# Patient Record
Sex: FEMALE | Race: OTHER | HISPANIC OR LATINO | Employment: OTHER | URBAN - METROPOLITAN AREA
[De-identification: names, ages, dates, MRNs, and addresses within clinical notes are randomized per-mention and may not be internally consistent; named-entity substitution may affect disease eponyms.]

---

## 2017-01-11 ENCOUNTER — HOSPITAL ENCOUNTER (OUTPATIENT)
Dept: LAB | Facility: MEDICAL CENTER | Age: 57
End: 2017-01-11
Attending: PHYSICIAN ASSISTANT
Payer: OTHER GOVERNMENT

## 2017-01-11 LAB
25(OH)D3 SERPL-MCNC: 15 NG/ML (ref 30–100)
ALBUMIN SERPL BCP-MCNC: 4.4 G/DL (ref 3.2–4.9)
ALBUMIN/GLOB SERPL: 1.3 G/DL
ALP SERPL-CCNC: 80 U/L (ref 30–99)
ALT SERPL-CCNC: 31 U/L (ref 2–50)
ANION GAP SERPL CALC-SCNC: 6 MMOL/L (ref 0–11.9)
APPEARANCE UR: CLEAR
AST SERPL-CCNC: 25 U/L (ref 12–45)
BASOPHILS # BLD AUTO: 0.03 K/UL (ref 0–0.12)
BASOPHILS NFR BLD AUTO: 0.5 % (ref 0–1.8)
BILIRUB SERPL-MCNC: 0.4 MG/DL (ref 0.1–1.5)
BILIRUB UR QL STRIP.AUTO: NEGATIVE
BUN SERPL-MCNC: 16 MG/DL (ref 8–22)
CALCIUM SERPL-MCNC: 9.4 MG/DL (ref 8.5–10.5)
CHLORIDE SERPL-SCNC: 109 MMOL/L (ref 96–112)
CHOLEST SERPL-MCNC: 167 MG/DL (ref 100–199)
CO2 SERPL-SCNC: 25 MMOL/L (ref 20–33)
COLOR UR AUTO: YELLOW
CREAT SERPL-MCNC: 0.84 MG/DL (ref 0.5–1.4)
CREAT UR-MCNC: 160.1 MG/DL
EOSINOPHIL # BLD: 0.11 K/UL (ref 0–0.51)
EOSINOPHIL NFR BLD AUTO: 2 % (ref 0–6.9)
EPITHELIAL CELLS 1715: ABNORMAL /HPF
ERYTHROCYTE [DISTWIDTH] IN BLOOD BY AUTOMATED COUNT: 40.9 FL (ref 35.9–50)
EST. AVERAGE GLUCOSE BLD GHB EST-MCNC: 105 MG/DL
GLOBULIN SER CALC-MCNC: 3.4 G/DL (ref 1.9–3.5)
GLUCOSE SERPL-MCNC: 103 MG/DL (ref 65–99)
GLUCOSE UR STRIP.AUTO-MCNC: NEGATIVE MG/DL
HBA1C MFR BLD: 5.3 % (ref 0–5.6)
HCT VFR BLD AUTO: 44 % (ref 37–47)
HDLC SERPL-MCNC: 36 MG/DL
HGB BLD-MCNC: 14.7 G/DL (ref 12–16)
IMM GRANULOCYTES # BLD AUTO: 0.02 K/UL (ref 0–0.11)
IMM GRANULOCYTES NFR BLD AUTO: 0.4 % (ref 0–0.9)
KETONES UR STRIP.AUTO-MCNC: NEGATIVE MG/DL
LDLC SERPL CALC-MCNC: 113 MG/DL
LEUKOCYTE ESTERASE UR QL STRIP.AUTO: ABNORMAL
LYMPHOCYTES # BLD: 1.97 K/UL (ref 1–4.8)
LYMPHOCYTES NFR BLD AUTO: 35.7 % (ref 22–41)
MCH RBC QN AUTO: 30.4 PG (ref 27–33)
MCHC RBC AUTO-ENTMCNC: 33.4 G/DL (ref 33.6–35)
MCV RBC AUTO: 91.1 FL (ref 81.4–97.8)
MICRO URNS: ABNORMAL
MICROALBUMIN UR-MCNC: 0.8 MG/DL
MICROALBUMIN/CREAT UR: 5 MG/G (ref 0–30)
MONOCYTES # BLD: 0.39 K/UL (ref 0–0.85)
MONOCYTES NFR BLD AUTO: 7.1 % (ref 0–13.4)
NEUTROPHILS # BLD: 3 K/UL (ref 2–7.15)
NEUTROPHILS NFR BLD AUTO: 54.3 % (ref 44–72)
NITRITE UR QL STRIP.AUTO: NEGATIVE
NRBC # BLD AUTO: 0 K/UL
NRBC BLD-RTO: 0 /100 WBC
PH UR: 5.5 [PH]
PLATELET # BLD AUTO: 270 K/UL (ref 164–446)
PMV BLD AUTO: 10.4 FL (ref 9–12.9)
POTASSIUM SERPL-SCNC: 4.2 MMOL/L (ref 3.6–5.5)
PROT SERPL-MCNC: 7.8 G/DL (ref 6–8.2)
PROT UR QL STRIP: NEGATIVE MG/DL
RBC # BLD AUTO: 4.83 M/UL (ref 4.2–5.4)
RBC #/AREA URNS HPF: ABNORMAL /HPF
RBC UR QL AUTO: ABNORMAL
SODIUM SERPL-SCNC: 140 MMOL/L (ref 135–145)
SP GR UR STRIP.AUTO: 1.02
T4 SERPL-MCNC: 7.4 UG/DL (ref 4–12)
TRANS CELLS URNS QL MICRO: ABNORMAL /HPF
TRIGL SERPL-MCNC: 90 MG/DL (ref 0–149)
TSH SERPL DL<=0.005 MIU/L-ACNC: 3.75 UIU/ML (ref 0.3–3.7)
WBC # BLD AUTO: 5.5 K/UL (ref 4.8–10.8)
WBC #/AREA URNS HPF: ABNORMAL /HPF

## 2017-01-11 PROCEDURE — 80053 COMPREHEN METABOLIC PANEL: CPT

## 2017-01-11 PROCEDURE — 83036 HEMOGLOBIN GLYCOSYLATED A1C: CPT

## 2017-01-11 PROCEDURE — 82043 UR ALBUMIN QUANTITATIVE: CPT

## 2017-01-11 PROCEDURE — 85025 COMPLETE CBC W/AUTO DIFF WBC: CPT

## 2017-01-11 PROCEDURE — 80061 LIPID PANEL: CPT

## 2017-01-11 PROCEDURE — 36415 COLL VENOUS BLD VENIPUNCTURE: CPT

## 2017-01-11 PROCEDURE — 84436 ASSAY OF TOTAL THYROXINE: CPT

## 2017-01-11 PROCEDURE — 82306 VITAMIN D 25 HYDROXY: CPT

## 2017-01-11 PROCEDURE — 82570 ASSAY OF URINE CREATININE: CPT

## 2017-01-11 PROCEDURE — 81001 URINALYSIS AUTO W/SCOPE: CPT

## 2017-01-11 PROCEDURE — 84443 ASSAY THYROID STIM HORMONE: CPT

## 2017-12-04 ENCOUNTER — HOSPITAL ENCOUNTER (OUTPATIENT)
Dept: RADIOLOGY | Facility: MEDICAL CENTER | Age: 57
End: 2017-12-04
Attending: NURSE PRACTITIONER
Payer: OTHER GOVERNMENT

## 2017-12-04 DIAGNOSIS — M79.671 RIGHT FOOT PAIN: ICD-10-CM

## 2017-12-04 PROCEDURE — 73630 X-RAY EXAM OF FOOT: CPT | Mod: RT

## 2018-12-10 ENCOUNTER — TELEPHONE (OUTPATIENT)
Dept: MEDICAL GROUP | Facility: PHYSICIAN GROUP | Age: 58
End: 2018-12-10

## 2018-12-10 NOTE — TELEPHONE ENCOUNTER
Future Appointments       Provider Department Center    12/11/2018 8:25 AM Maryan Little P.A.-C. [facility]        NEW PATIENT VISIT PRE-VISIT PLANNING    1.  EpicCare Patient is checked in Patient Demographics? YES    2.  Immunizations were updated in Epic using WebIZ?: No WebIZ record       •  Web Iz Recommendations: FLU, TDAP and ZOSTAVAX (Shingles)    3.  Is this appointment scheduled as a Hospital Follow-Up? No    4.  Patient is due for the following Health Maintenance Topics:   Health Maintenance Due   Topic Date Due   • IMM DTaP/Tdap/Td Vaccine (1 - Tdap) 08/27/1981   • PAP SMEAR  08/27/1983   • MAMMOGRAM  08/27/2002   • COLONOSCOPY  08/27/2012   • IMM ZOSTER VACCINES (1 of 2) 08/27/2012   • IMM INFLUENZA (1) 09/01/2018       5.  Reviewed/Updated the following with patient:   •   Preferred Pharmacy? YES       •   Preferred Lab? YES       •   Preferred Communication? YES       •   Allergies? YES       •   Medications? YES. Was Abstract Encounter opened and chart updated? YES       •   Social History? YES. Was Abstract Encounter opened and chart updated? YES       •   Family History (document living status of immediate family members and if + hx of cancer, diabetes, hypertension, hyperlipidemia, heart attack, stroke) YES. Was Abstract Encounter opened and chart updated? YES    6.  Updated Care Team?       •   DME Company (gait device, O2, CPAP, etc.) YES       •   Other Specialists (eye doctor, derm, GYN, cardiology, endo, etc): YES    7.  MDX printed for Provider? NO    8.  Patient was informed to arrive 15 min prior to their   scheduled appointment and bring in their medication bottles.

## 2018-12-11 ENCOUNTER — OFFICE VISIT (OUTPATIENT)
Dept: MEDICAL GROUP | Facility: PHYSICIAN GROUP | Age: 58
End: 2018-12-11
Payer: OTHER GOVERNMENT

## 2018-12-11 VITALS
OXYGEN SATURATION: 95 % | HEART RATE: 76 BPM | HEIGHT: 65 IN | BODY MASS INDEX: 30.16 KG/M2 | SYSTOLIC BLOOD PRESSURE: 106 MMHG | TEMPERATURE: 98.6 F | DIASTOLIC BLOOD PRESSURE: 70 MMHG | WEIGHT: 181 LBS

## 2018-12-11 DIAGNOSIS — Z00.00 ANNUAL PHYSICAL EXAM: ICD-10-CM

## 2018-12-11 DIAGNOSIS — E66.9 OBESITY (BMI 30-39.9): ICD-10-CM

## 2018-12-11 DIAGNOSIS — Z12.39 ENCOUNTER FOR SCREENING FOR MALIGNANT NEOPLASM OF BREAST: ICD-10-CM

## 2018-12-11 DIAGNOSIS — Z12.12 SCREENING FOR COLORECTAL CANCER: ICD-10-CM

## 2018-12-11 DIAGNOSIS — Z00.00 BLOOD TESTS FOR ROUTINE GENERAL PHYSICAL EXAMINATION: ICD-10-CM

## 2018-12-11 DIAGNOSIS — Z12.11 SCREENING FOR COLORECTAL CANCER: ICD-10-CM

## 2018-12-11 PROCEDURE — 99396 PREV VISIT EST AGE 40-64: CPT | Performed by: PHYSICIAN ASSISTANT

## 2018-12-11 ASSESSMENT — PATIENT HEALTH QUESTIONNAIRE - PHQ9: CLINICAL INTERPRETATION OF PHQ2 SCORE: 0

## 2018-12-11 NOTE — PROGRESS NOTES
"Dominga Hatfield is a 58 y.o. female here for annual. Is a new patient to me and [facility] and is also establishing care today.    HPI:      Patient presents today for annual exam.  She is new to me, previous patient of Mariah Gomez PA-C at [facility]. She endorses no current medical problems. No history of any serious medical problems. She is obese with current BMI just over 30. Patient acknowledges today that she needs to lose weight. She admits that she has let herself go since previous clavicle fracture and rotator cuff tear that she suffered in 2014 and 2016. Prior to that, went through Weight Watchers program and did really well with it, losing significant weight. She is motivated to lose weight. She denies any other complaints or concerns today.      Current medicines (including changes today)  Current Outpatient Prescriptions   Medication Sig Dispense Refill   • Multiple Vitamins-Minerals (MULTIVITAL PO) Take  by mouth.     • Omega-3 Fatty Acids (FISH OIL PO) Take  by mouth.     • CALCIUM PO Take  by mouth.       No current facility-administered medications for this visit.      She  has a past medical history of Clavicle fracture and Rotator cuff tear arthropathy, right.  She  has a past surgical history that includes arthroplasty, shoulder (Right, 2016) and clavicle orif (2014).  Social History   Substance Use Topics   • Smoking status: Never Smoker   • Smokeless tobacco: Never Used   • Alcohol use No     Social History     Social History Narrative   • No narrative on file     Family History   Problem Relation Age of Onset   • Family history unknown: Yes     No family status information on file.         ROS  No chest pain, No SOB  All other systems reviewed and are negative       Objective:     Blood pressure 106/70, pulse 76, temperature 37 °C (98.6 °F), height 1.638 m (5' 4.5\"), weight 82.1 kg (181 lb), SpO2 95 %. Body mass index is 30.59 kg/m².  Physical Exam:    Constitutional: Alert, obese but otherwise " well-appearing, no distress.  Skin: Warm, dry, good turgor, no rashes in visible areas.  Eye: Equal, round and reactive, conjunctiva clear, lids normal.  ENMT: Lips without lesions, good dentition, oropharynx clear.  Neck: Trachea midline, no masses, no thyromegaly. No submandibular or cervical  lymphadenopathy.  Respiratory: Unlabored respiratory effort, lungs clear to auscultation, no wheezes, no ronchi.  Cardiovascular: Normal S1, S2, no murmur, no edema.  Abdomen: Soft, non-tender, no masses, no hepatosplenomegaly.  Psych: Alert and oriented x3, normal affect and mood.      Assessment and Plan:   The following treatment plan was discussed    1. Annual physical exam  Exam today is normal other than obesity. Counseled patient on diet and exercise.  HCM: Due for colonoscopy which she declines. Is willing to do FIT which I have ordered. Due for mammogram which I have ordered. Due for influenza vaccine which she declines. Due for pap smear - will schedule well-woman exam    2. Obesity (BMI 30-39.9)  Chronic issue for patient, uncontrolled. Patient expresses motivation to improve lifestyle to lose weight. I did also discuss the Medical Weight Management Program which she is interested in. Referral ordered.  - Patient identified as having weight management issue.  Appropriate orders and counseling given.    3. Encounter for screening for malignant neoplasm of breast  - MA-SCREEN MAMMO W/CAD-BILAT; Future    4. Screening for colorectal cancer  - OCCULT BLOOD FECES IMMUNOASSAY (FIT); Future    5. Blood tests for routine general physical examination  Patient advised to have screening lab work done at earliest convenience.  - COMP METABOLIC PANEL; Future  - Lipid Profile; Future  - VITAMIN D,25 HYDROXY; Future      Followup: Return for well-woman; Short.    Maryan Little P.A.-C.

## 2018-12-14 ENCOUNTER — APPOINTMENT (OUTPATIENT)
Dept: RADIOLOGY | Facility: MEDICAL CENTER | Age: 58
End: 2018-12-14
Attending: PHYSICIAN ASSISTANT
Payer: OTHER GOVERNMENT

## 2018-12-17 ENCOUNTER — HOSPITAL ENCOUNTER (OUTPATIENT)
Dept: RADIOLOGY | Facility: MEDICAL CENTER | Age: 58
End: 2018-12-17
Attending: PHYSICIAN ASSISTANT
Payer: OTHER GOVERNMENT

## 2018-12-17 DIAGNOSIS — Z12.39 ENCOUNTER FOR SCREENING FOR MALIGNANT NEOPLASM OF BREAST: ICD-10-CM

## 2018-12-17 PROCEDURE — 77067 SCR MAMMO BI INCL CAD: CPT

## 2018-12-18 ENCOUNTER — TELEPHONE (OUTPATIENT)
Dept: MEDICAL GROUP | Facility: PHYSICIAN GROUP | Age: 58
End: 2018-12-18

## 2018-12-18 NOTE — TELEPHONE ENCOUNTER
Future Appointments       Provider Department Center    12/19/2018 8:45 AM Maryan Little P.A.-C. [facility]      ESTABLISHED PATIENT PRE-VISIT PLANNING     Patient was contacted to complete PVP.     Note: Patient will not be contacted if there is no indication to call.     1.  Reviewed notes from the last few office visits within the medical group: Yes    2.  If any orders were placed at last visit or intended to be done for this visit (i.e. 6 mos follow-up), do we have Results/Consult Notes?        •  Labs - Labs ordered, NOT completed. Patient advised to complete prior to next appointment.       •  Imaging - Imaging ordered, completed and results are in chart.       •  Referrals - Referral ordered, patient has NOT been seen.    3. Is this appointment scheduled as a Hospital Follow-Up? No    4.  Immunizations were updated in Epic using WebIZ?: No WebIZ record       •  Web Iz Recommendations: FLU, TDAP and SHINGRIX (Shingles)    5.  Patient is due for the following Health Maintenance Topics:   Health Maintenance Due   Topic Date Due   • IMM DTaP/Tdap/Td Vaccine (1 - Tdap) 08/27/1981   • PAP SMEAR  08/27/1983   • MAMMOGRAM  08/27/2002   • COLON CANCER SCREENING ANNUAL FIT  08/27/2012   • IMM ZOSTER VACCINES (1 of 2) 08/27/2012   • IMM INFLUENZA (1) 09/01/2018       - Patient already has appointment scheduled for Pap.    6.  MDX printed for Provider? NO    7.  Patient was informed to arrive 15 min prior to their scheduled appointment and bring in their medication bottles. SILKE

## 2018-12-19 ENCOUNTER — HOSPITAL ENCOUNTER (OUTPATIENT)
Facility: MEDICAL CENTER | Age: 58
End: 2018-12-19
Attending: PHYSICIAN ASSISTANT
Payer: OTHER GOVERNMENT

## 2018-12-19 ENCOUNTER — OFFICE VISIT (OUTPATIENT)
Dept: MEDICAL GROUP | Facility: PHYSICIAN GROUP | Age: 58
End: 2018-12-19
Payer: OTHER GOVERNMENT

## 2018-12-19 ENCOUNTER — HOSPITAL ENCOUNTER (OUTPATIENT)
Dept: LAB | Facility: MEDICAL CENTER | Age: 58
End: 2018-12-19
Attending: PHYSICIAN ASSISTANT
Payer: OTHER GOVERNMENT

## 2018-12-19 VITALS
SYSTOLIC BLOOD PRESSURE: 102 MMHG | BODY MASS INDEX: 30.16 KG/M2 | HEART RATE: 72 BPM | DIASTOLIC BLOOD PRESSURE: 72 MMHG | OXYGEN SATURATION: 95 % | HEIGHT: 65 IN | TEMPERATURE: 97.3 F | WEIGHT: 181 LBS

## 2018-12-19 DIAGNOSIS — E55.9 VITAMIN D DEFICIENCY: ICD-10-CM

## 2018-12-19 DIAGNOSIS — Z01.419 ENCOUNTER FOR ROUTINE GYNECOLOGICAL EXAMINATION WITH PAPANICOLAOU SMEAR OF CERVIX: ICD-10-CM

## 2018-12-19 DIAGNOSIS — Z00.00 BLOOD TESTS FOR ROUTINE GENERAL PHYSICAL EXAMINATION: ICD-10-CM

## 2018-12-19 LAB
25(OH)D3 SERPL-MCNC: 18 NG/ML (ref 30–100)
ALBUMIN SERPL BCP-MCNC: 4.1 G/DL (ref 3.2–4.9)
ALBUMIN/GLOB SERPL: 1.3 G/DL
ALP SERPL-CCNC: 75 U/L (ref 30–99)
ALT SERPL-CCNC: 32 U/L (ref 2–50)
ANION GAP SERPL CALC-SCNC: 7 MMOL/L (ref 0–11.9)
AST SERPL-CCNC: 24 U/L (ref 12–45)
BILIRUB SERPL-MCNC: 0.5 MG/DL (ref 0.1–1.5)
BUN SERPL-MCNC: 16 MG/DL (ref 8–22)
CALCIUM SERPL-MCNC: 9.5 MG/DL (ref 8.5–10.5)
CHLORIDE SERPL-SCNC: 109 MMOL/L (ref 96–112)
CHOLEST SERPL-MCNC: 154 MG/DL (ref 100–199)
CO2 SERPL-SCNC: 27 MMOL/L (ref 20–33)
CREAT SERPL-MCNC: 0.9 MG/DL (ref 0.5–1.4)
GLOBULIN SER CALC-MCNC: 3.2 G/DL (ref 1.9–3.5)
GLUCOSE SERPL-MCNC: 98 MG/DL (ref 65–99)
HDLC SERPL-MCNC: 38 MG/DL
LDLC SERPL CALC-MCNC: 100 MG/DL
POTASSIUM SERPL-SCNC: 4.1 MMOL/L (ref 3.6–5.5)
PROT SERPL-MCNC: 7.3 G/DL (ref 6–8.2)
SODIUM SERPL-SCNC: 143 MMOL/L (ref 135–145)
TRIGL SERPL-MCNC: 79 MG/DL (ref 0–149)

## 2018-12-19 PROCEDURE — 36415 COLL VENOUS BLD VENIPUNCTURE: CPT

## 2018-12-19 PROCEDURE — 87624 HPV HI-RISK TYP POOLED RSLT: CPT

## 2018-12-19 PROCEDURE — 99396 PREV VISIT EST AGE 40-64: CPT | Performed by: PHYSICIAN ASSISTANT

## 2018-12-19 PROCEDURE — 80061 LIPID PANEL: CPT

## 2018-12-19 PROCEDURE — 82306 VITAMIN D 25 HYDROXY: CPT

## 2018-12-19 PROCEDURE — 88175 CYTOPATH C/V AUTO FLUID REDO: CPT

## 2018-12-19 PROCEDURE — 82274 ASSAY TEST FOR BLOOD FECAL: CPT

## 2018-12-19 PROCEDURE — 80053 COMPREHEN METABOLIC PANEL: CPT

## 2018-12-19 RX ORDER — ERGOCALCIFEROL 1.25 MG/1
50000 CAPSULE ORAL
Qty: 12 CAP | Refills: 0 | Status: SHIPPED | OUTPATIENT
Start: 2018-12-19 | End: 2020-09-16

## 2018-12-19 NOTE — PROGRESS NOTES
"Chief Complaint: Well-woman    Dominga Hatfield is a 58 y.o. female who presents for annual gynecologic exam with pap smear. Is an established patient of mine.      Pt has GYN provider: no  Last pap smear: 2 years ago and normal per patient. 1 previous abnormal \"years ago.\" States had repeated and was then normal.  Last mammogram: 12/17/18 - results not back yet  Last Td: 2016    Gynecologic concerns today: none    Patient specifically denies any vaginal discharge, itching, burning, foul odor, genital lesions, pelvic pain. She is not currently sexually active. No current concern for STDs.    She also denies any breast concerns. She specifically denies any breast skin changes, palpable lumps, nipple discharge, breast tenderness, or swollen axillary lymph nodes. Does self-breast exams about once a month.    No family history of gynecologic cancers.    She  has a past medical history of Clavicle fracture and Rotator cuff tear arthropathy, right.  She  has a past surgical history that includes arthroplasty, shoulder (Right, 2016) and clavicle orif (2014).  Current Outpatient Prescriptions   Medication Sig Dispense Refill   • Multiple Vitamins-Minerals (MULTIVITAL PO) Take  by mouth.     • Omega-3 Fatty Acids (FISH OIL PO) Take  by mouth.     • CALCIUM PO Take  by mouth.       No current facility-administered medications for this visit.      Social History   Substance Use Topics   • Smoking status: Never Smoker   • Smokeless tobacco: Never Used   • Alcohol use No       Review Of Systems  As above.      PHYSICAL EXAMINATION:  Blood pressure 102/72, pulse 72, temperature 36.3 °C (97.3 °F), height 1.638 m (5' 4.5\"), weight 82.1 kg (181 lb), SpO2 95 %.  Body mass index is 30.59 kg/m².  Wt Readings from Last 4 Encounters:   12/19/18 82.1 kg (181 lb)   12/11/18 82.1 kg (181 lb)       Constitutional: Alert, well-appearing, no distress.  Skin: Warm, dry, good turgor, no rashes or suspicious lesions in visible areas.  Eye: " Conjunctivae clear, lids normal.  ENMT: Lips without lesions, moist mucus membranes.  Breast: Breasts are normal-appearing and symmetric bilaterally. No skin abnormalities visualized. There are no masses palpated, no tenderness. No visible nipple discharge. No axillary or supraclavicular lymphadenopathy palpated.  Pelvic: Normal-appearing external genitalia. No genital lesions noted. On speculum insertion, vaginal walls have normal rugated appearance. No abnormal-appearing discharge is noted. Cervix easily visualized and appears pink, non-friable and without visible lesions. On bimanual exam, there is no cervical motion tenderness. Uterus is mobile, non-tender and without palpable masses. No adnexal masses or tenderness.    A chaperone was offered to the patient during today's exam. Patient declined chaperone.     ASSESSMENT/PLAN:    1. Encounter for routine gynecological examination with Papanicolaou smear of cervix  Normal exam today. Patient will be notified of pap smear/HPV and mammogram results when back.  HCM:  Pap done today. Declines influenza. Otherwise UTD.   Immunizations per orders  - THINPREP PAP WITH HPV; Future      Return in about 1 year (around 12/19/2019) for Annual; Short.      Maryan Little P.A.-C.

## 2018-12-20 LAB
CYTOLOGY REG CYTOL: NORMAL
HPV HR 12 DNA CVX QL NAA+PROBE: NEGATIVE
HPV16 DNA SPEC QL NAA+PROBE: NEGATIVE
HPV18 DNA SPEC QL NAA+PROBE: NEGATIVE
SPECIMEN SOURCE: NORMAL

## 2018-12-21 DIAGNOSIS — Z12.11 SCREENING FOR COLORECTAL CANCER: ICD-10-CM

## 2018-12-21 DIAGNOSIS — Z12.12 SCREENING FOR COLORECTAL CANCER: ICD-10-CM

## 2018-12-24 ENCOUNTER — TELEPHONE (OUTPATIENT)
Dept: MEDICAL GROUP | Facility: PHYSICIAN GROUP | Age: 58
End: 2018-12-24

## 2018-12-24 LAB — HEMOCCULT STL QL IA: NEGATIVE

## 2018-12-24 NOTE — TELEPHONE ENCOUNTER
----- Message from Maryan Little P.A.-C. sent at 12/24/2018 12:54 AM PST -----  Please inform patient of normal pap smear/HPV results.  Maryan Little P.A.-C.

## 2019-01-04 ENCOUNTER — APPOINTMENT (OUTPATIENT)
Dept: RADIOLOGY | Facility: MEDICAL CENTER | Age: 59
End: 2019-01-04
Attending: EMERGENCY MEDICINE
Payer: OTHER GOVERNMENT

## 2019-01-04 ENCOUNTER — HOSPITAL ENCOUNTER (OUTPATIENT)
Facility: MEDICAL CENTER | Age: 59
End: 2019-01-05
Attending: EMERGENCY MEDICINE | Admitting: HOSPITALIST
Payer: OTHER GOVERNMENT

## 2019-01-04 DIAGNOSIS — R07.9 CHEST PAIN, UNSPECIFIED TYPE: ICD-10-CM

## 2019-01-04 DIAGNOSIS — R06.00 DYSPNEA, UNSPECIFIED TYPE: ICD-10-CM

## 2019-01-04 LAB
ALBUMIN SERPL BCP-MCNC: 4.4 G/DL (ref 3.2–4.9)
ALBUMIN/GLOB SERPL: 1.3 G/DL
ALP SERPL-CCNC: 84 U/L (ref 30–99)
ALT SERPL-CCNC: 49 U/L (ref 2–50)
ANION GAP SERPL CALC-SCNC: 8 MMOL/L (ref 0–11.9)
APTT PPP: 26.8 SEC (ref 24.7–36)
AST SERPL-CCNC: 27 U/L (ref 12–45)
BASOPHILS # BLD AUTO: 0.6 % (ref 0–1.8)
BASOPHILS # BLD: 0.04 K/UL (ref 0–0.12)
BILIRUB SERPL-MCNC: 0.4 MG/DL (ref 0.1–1.5)
BNP SERPL-MCNC: 8 PG/ML (ref 0–100)
BUN SERPL-MCNC: 19 MG/DL (ref 8–22)
CALCIUM SERPL-MCNC: 10.4 MG/DL (ref 8.5–10.5)
CHLORIDE SERPL-SCNC: 106 MMOL/L (ref 96–112)
CO2 SERPL-SCNC: 27 MMOL/L (ref 20–33)
CREAT SERPL-MCNC: 1.08 MG/DL (ref 0.5–1.4)
D DIMER PPP IA.FEU-MCNC: 0.56 UG/ML (FEU) (ref 0–0.5)
EKG IMPRESSION: NORMAL
EOSINOPHIL # BLD AUTO: 0.08 K/UL (ref 0–0.51)
EOSINOPHIL NFR BLD: 1.1 % (ref 0–6.9)
ERYTHROCYTE [DISTWIDTH] IN BLOOD BY AUTOMATED COUNT: 40.8 FL (ref 35.9–50)
GLOBULIN SER CALC-MCNC: 3.4 G/DL (ref 1.9–3.5)
GLUCOSE SERPL-MCNC: 120 MG/DL (ref 65–99)
HCT VFR BLD AUTO: 44.1 % (ref 37–47)
HGB BLD-MCNC: 14.7 G/DL (ref 12–16)
IMM GRANULOCYTES # BLD AUTO: 0.02 K/UL (ref 0–0.11)
IMM GRANULOCYTES NFR BLD AUTO: 0.3 % (ref 0–0.9)
INR PPP: 1.02 (ref 0.87–1.13)
LIPASE SERPL-CCNC: 37 U/L (ref 11–82)
LYMPHOCYTES # BLD AUTO: 1.7 K/UL (ref 1–4.8)
LYMPHOCYTES NFR BLD: 24.3 % (ref 22–41)
MCH RBC QN AUTO: 29.9 PG (ref 27–33)
MCHC RBC AUTO-ENTMCNC: 33.3 G/DL (ref 33.6–35)
MCV RBC AUTO: 89.8 FL (ref 81.4–97.8)
MONOCYTES # BLD AUTO: 0.42 K/UL (ref 0–0.85)
MONOCYTES NFR BLD AUTO: 6 % (ref 0–13.4)
NEUTROPHILS # BLD AUTO: 4.74 K/UL (ref 2–7.15)
NEUTROPHILS NFR BLD: 67.7 % (ref 44–72)
NRBC # BLD AUTO: 0 K/UL
NRBC BLD-RTO: 0 /100 WBC
PLATELET # BLD AUTO: 272 K/UL (ref 164–446)
PMV BLD AUTO: 10.2 FL (ref 9–12.9)
POTASSIUM SERPL-SCNC: 3.6 MMOL/L (ref 3.6–5.5)
PROT SERPL-MCNC: 7.8 G/DL (ref 6–8.2)
PROTHROMBIN TIME: 13.5 SEC (ref 12–14.6)
RBC # BLD AUTO: 4.91 M/UL (ref 4.2–5.4)
SODIUM SERPL-SCNC: 141 MMOL/L (ref 135–145)
TROPONIN I SERPL-MCNC: <0.01 NG/ML (ref 0–0.04)
WBC # BLD AUTO: 7 K/UL (ref 4.8–10.8)

## 2019-01-04 PROCEDURE — A9270 NON-COVERED ITEM OR SERVICE: HCPCS | Performed by: EMERGENCY MEDICINE

## 2019-01-04 PROCEDURE — 83880 ASSAY OF NATRIURETIC PEPTIDE: CPT

## 2019-01-04 PROCEDURE — 36415 COLL VENOUS BLD VENIPUNCTURE: CPT

## 2019-01-04 PROCEDURE — 84443 ASSAY THYROID STIM HORMONE: CPT

## 2019-01-04 PROCEDURE — 83036 HEMOGLOBIN GLYCOSYLATED A1C: CPT

## 2019-01-04 PROCEDURE — 85730 THROMBOPLASTIN TIME PARTIAL: CPT

## 2019-01-04 PROCEDURE — 94760 N-INVAS EAR/PLS OXIMETRY 1: CPT

## 2019-01-04 PROCEDURE — 99285 EMERGENCY DEPT VISIT HI MDM: CPT

## 2019-01-04 PROCEDURE — 85025 COMPLETE CBC W/AUTO DIFF WBC: CPT

## 2019-01-04 PROCEDURE — 83690 ASSAY OF LIPASE: CPT

## 2019-01-04 PROCEDURE — 93005 ELECTROCARDIOGRAM TRACING: CPT

## 2019-01-04 PROCEDURE — 93005 ELECTROCARDIOGRAM TRACING: CPT | Performed by: EMERGENCY MEDICINE

## 2019-01-04 PROCEDURE — 700102 HCHG RX REV CODE 250 W/ 637 OVERRIDE(OP): Performed by: EMERGENCY MEDICINE

## 2019-01-04 PROCEDURE — 85379 FIBRIN DEGRADATION QUANT: CPT

## 2019-01-04 PROCEDURE — 71045 X-RAY EXAM CHEST 1 VIEW: CPT

## 2019-01-04 PROCEDURE — G0378 HOSPITAL OBSERVATION PER HR: HCPCS

## 2019-01-04 PROCEDURE — 80053 COMPREHEN METABOLIC PANEL: CPT

## 2019-01-04 PROCEDURE — 84484 ASSAY OF TROPONIN QUANT: CPT

## 2019-01-04 PROCEDURE — 99220 PR INITIAL OBSERVATION CARE,LEVL III: CPT | Performed by: HOSPITALIST

## 2019-01-04 PROCEDURE — 85610 PROTHROMBIN TIME: CPT

## 2019-01-04 RX ORDER — HEPARIN SODIUM 5000 [USP'U]/ML
5000 INJECTION, SOLUTION INTRAVENOUS; SUBCUTANEOUS EVERY 8 HOURS
Status: DISCONTINUED | OUTPATIENT
Start: 2019-01-04 | End: 2019-01-05 | Stop reason: HOSPADM

## 2019-01-04 RX ORDER — ASPIRIN 81 MG/1
324 TABLET, CHEWABLE ORAL DAILY
Status: DISCONTINUED | OUTPATIENT
Start: 2019-01-05 | End: 2019-01-05 | Stop reason: HOSPADM

## 2019-01-04 RX ORDER — ASPIRIN 81 MG/1
324 TABLET, CHEWABLE ORAL ONCE
Status: COMPLETED | OUTPATIENT
Start: 2019-01-04 | End: 2019-01-04

## 2019-01-04 RX ORDER — HYDROMORPHONE HYDROCHLORIDE 1 MG/ML
0.5 INJECTION, SOLUTION INTRAMUSCULAR; INTRAVENOUS; SUBCUTANEOUS
Status: DISCONTINUED | OUTPATIENT
Start: 2019-01-04 | End: 2019-01-05 | Stop reason: HOSPADM

## 2019-01-04 RX ORDER — BISACODYL 10 MG
10 SUPPOSITORY, RECTAL RECTAL
Status: DISCONTINUED | OUTPATIENT
Start: 2019-01-04 | End: 2019-01-05 | Stop reason: HOSPADM

## 2019-01-04 RX ORDER — POLYETHYLENE GLYCOL 3350 17 G/17G
1 POWDER, FOR SOLUTION ORAL
Status: DISCONTINUED | OUTPATIENT
Start: 2019-01-04 | End: 2019-01-05 | Stop reason: HOSPADM

## 2019-01-04 RX ORDER — ASPIRIN 325 MG
325 TABLET ORAL DAILY
Status: DISCONTINUED | OUTPATIENT
Start: 2019-01-05 | End: 2019-01-05 | Stop reason: HOSPADM

## 2019-01-04 RX ORDER — ONDANSETRON 2 MG/ML
4 INJECTION INTRAMUSCULAR; INTRAVENOUS EVERY 4 HOURS PRN
Status: DISCONTINUED | OUTPATIENT
Start: 2019-01-04 | End: 2019-01-05 | Stop reason: HOSPADM

## 2019-01-04 RX ORDER — ASPIRIN 300 MG/1
300 SUPPOSITORY RECTAL DAILY
Status: DISCONTINUED | OUTPATIENT
Start: 2019-01-05 | End: 2019-01-05 | Stop reason: HOSPADM

## 2019-01-04 RX ORDER — ONDANSETRON 4 MG/1
4 TABLET, ORALLY DISINTEGRATING ORAL EVERY 4 HOURS PRN
Status: DISCONTINUED | OUTPATIENT
Start: 2019-01-04 | End: 2019-01-05 | Stop reason: HOSPADM

## 2019-01-04 RX ORDER — PROMETHAZINE HYDROCHLORIDE 12.5 MG/1
12.5-25 SUPPOSITORY RECTAL EVERY 4 HOURS PRN
Status: DISCONTINUED | OUTPATIENT
Start: 2019-01-04 | End: 2019-01-05 | Stop reason: HOSPADM

## 2019-01-04 RX ORDER — AMOXICILLIN 250 MG
2 CAPSULE ORAL 2 TIMES DAILY
Status: DISCONTINUED | OUTPATIENT
Start: 2019-01-04 | End: 2019-01-05 | Stop reason: HOSPADM

## 2019-01-04 RX ORDER — OXYCODONE HYDROCHLORIDE 5 MG/1
5 TABLET ORAL
Status: DISCONTINUED | OUTPATIENT
Start: 2019-01-04 | End: 2019-01-05 | Stop reason: HOSPADM

## 2019-01-04 RX ORDER — OXYCODONE HYDROCHLORIDE 10 MG/1
10 TABLET ORAL
Status: DISCONTINUED | OUTPATIENT
Start: 2019-01-04 | End: 2019-01-05 | Stop reason: HOSPADM

## 2019-01-04 RX ORDER — PROMETHAZINE HYDROCHLORIDE 25 MG/1
12.5-25 TABLET ORAL EVERY 4 HOURS PRN
Status: DISCONTINUED | OUTPATIENT
Start: 2019-01-04 | End: 2019-01-05 | Stop reason: HOSPADM

## 2019-01-04 RX ADMIN — ASPIRIN 324 MG: 81 TABLET, CHEWABLE ORAL at 23:00

## 2019-01-04 ASSESSMENT — PAIN SCALES - GENERAL: PAINLEVEL_OUTOF10: 7

## 2019-01-05 ENCOUNTER — APPOINTMENT (OUTPATIENT)
Dept: RADIOLOGY | Facility: MEDICAL CENTER | Age: 59
End: 2019-01-05
Attending: HOSPITALIST
Payer: OTHER GOVERNMENT

## 2019-01-05 ENCOUNTER — PATIENT OUTREACH (OUTPATIENT)
Dept: HEALTH INFORMATION MANAGEMENT | Facility: OTHER | Age: 59
End: 2019-01-05

## 2019-01-05 VITALS
OXYGEN SATURATION: 96 % | HEART RATE: 91 BPM | DIASTOLIC BLOOD PRESSURE: 58 MMHG | RESPIRATION RATE: 16 BRPM | SYSTOLIC BLOOD PRESSURE: 103 MMHG | BODY MASS INDEX: 30.64 KG/M2 | WEIGHT: 179.45 LBS | TEMPERATURE: 97.2 F | HEIGHT: 64 IN

## 2019-01-05 PROBLEM — R79.89 ELEVATED D-DIMER: Status: ACTIVE | Noted: 2019-01-05

## 2019-01-05 PROBLEM — R07.81 PLEURITIC CHEST PAIN: Status: ACTIVE | Noted: 2019-01-05

## 2019-01-05 LAB
25(OH)D3 SERPL-MCNC: 34 NG/ML (ref 30–100)
ALBUMIN SERPL BCP-MCNC: 4.2 G/DL (ref 3.2–4.9)
ALBUMIN/GLOB SERPL: 1.5 G/DL
ALP SERPL-CCNC: 77 U/L (ref 30–99)
ALT SERPL-CCNC: 43 U/L (ref 2–50)
ANION GAP SERPL CALC-SCNC: 8 MMOL/L (ref 0–11.9)
AST SERPL-CCNC: 26 U/L (ref 12–45)
BASOPHILS # BLD AUTO: 0.6 % (ref 0–1.8)
BASOPHILS # BLD: 0.04 K/UL (ref 0–0.12)
BILIRUB SERPL-MCNC: 0.4 MG/DL (ref 0.1–1.5)
BUN SERPL-MCNC: 20 MG/DL (ref 8–22)
CALCIUM SERPL-MCNC: 9.3 MG/DL (ref 8.5–10.5)
CHLORIDE SERPL-SCNC: 106 MMOL/L (ref 96–112)
CHOLEST SERPL-MCNC: 169 MG/DL (ref 100–199)
CO2 SERPL-SCNC: 24 MMOL/L (ref 20–33)
CREAT SERPL-MCNC: 0.93 MG/DL (ref 0.5–1.4)
EKG IMPRESSION: NORMAL
EOSINOPHIL # BLD AUTO: 0.16 K/UL (ref 0–0.51)
EOSINOPHIL NFR BLD: 2.3 % (ref 0–6.9)
ERYTHROCYTE [DISTWIDTH] IN BLOOD BY AUTOMATED COUNT: 41.2 FL (ref 35.9–50)
EST. AVERAGE GLUCOSE BLD GHB EST-MCNC: 120 MG/DL
GLOBULIN SER CALC-MCNC: 2.8 G/DL (ref 1.9–3.5)
GLUCOSE SERPL-MCNC: 94 MG/DL (ref 65–99)
HBA1C MFR BLD: 5.8 % (ref 0–5.6)
HCT VFR BLD AUTO: 41.3 % (ref 37–47)
HDLC SERPL-MCNC: 39 MG/DL
HGB BLD-MCNC: 13.7 G/DL (ref 12–16)
IMM GRANULOCYTES # BLD AUTO: 0.01 K/UL (ref 0–0.11)
IMM GRANULOCYTES NFR BLD AUTO: 0.1 % (ref 0–0.9)
LDLC SERPL CALC-MCNC: 113 MG/DL
LYMPHOCYTES # BLD AUTO: 3.09 K/UL (ref 1–4.8)
LYMPHOCYTES NFR BLD: 43.9 % (ref 22–41)
MCH RBC QN AUTO: 30.1 PG (ref 27–33)
MCHC RBC AUTO-ENTMCNC: 33.2 G/DL (ref 33.6–35)
MCV RBC AUTO: 90.8 FL (ref 81.4–97.8)
MONOCYTES # BLD AUTO: 0.55 K/UL (ref 0–0.85)
MONOCYTES NFR BLD AUTO: 7.8 % (ref 0–13.4)
NEUTROPHILS # BLD AUTO: 3.19 K/UL (ref 2–7.15)
NEUTROPHILS NFR BLD: 45.3 % (ref 44–72)
NRBC # BLD AUTO: 0 K/UL
NRBC BLD-RTO: 0 /100 WBC
PLATELET # BLD AUTO: 267 K/UL (ref 164–446)
PMV BLD AUTO: 10.5 FL (ref 9–12.9)
POTASSIUM SERPL-SCNC: 3.8 MMOL/L (ref 3.6–5.5)
PROT SERPL-MCNC: 7 G/DL (ref 6–8.2)
RBC # BLD AUTO: 4.55 M/UL (ref 4.2–5.4)
SODIUM SERPL-SCNC: 138 MMOL/L (ref 135–145)
TRIGL SERPL-MCNC: 84 MG/DL (ref 0–149)
TROPONIN I SERPL-MCNC: <0.01 NG/ML (ref 0–0.04)
TROPONIN I SERPL-MCNC: <0.01 NG/ML (ref 0–0.04)
TSH SERPL DL<=0.005 MIU/L-ACNC: 2.9 UIU/ML (ref 0.38–5.33)
WBC # BLD AUTO: 7 K/UL (ref 4.8–10.8)

## 2019-01-05 PROCEDURE — 700102 HCHG RX REV CODE 250 W/ 637 OVERRIDE(OP): Performed by: HOSPITALIST

## 2019-01-05 PROCEDURE — 99217 PR OBSERVATION CARE DISCHARGE: CPT | Performed by: INTERNAL MEDICINE

## 2019-01-05 PROCEDURE — 700102 HCHG RX REV CODE 250 W/ 637 OVERRIDE(OP): Performed by: INTERNAL MEDICINE

## 2019-01-05 PROCEDURE — A9502 TC99M TETROFOSMIN: HCPCS

## 2019-01-05 PROCEDURE — 80061 LIPID PANEL: CPT

## 2019-01-05 PROCEDURE — 93005 ELECTROCARDIOGRAM TRACING: CPT | Performed by: HOSPITALIST

## 2019-01-05 PROCEDURE — 71275 CT ANGIOGRAPHY CHEST: CPT

## 2019-01-05 PROCEDURE — G0378 HOSPITAL OBSERVATION PER HR: HCPCS

## 2019-01-05 PROCEDURE — A9270 NON-COVERED ITEM OR SERVICE: HCPCS | Performed by: HOSPITALIST

## 2019-01-05 PROCEDURE — 85025 COMPLETE CBC W/AUTO DIFF WBC: CPT

## 2019-01-05 PROCEDURE — 700111 HCHG RX REV CODE 636 W/ 250 OVERRIDE (IP)

## 2019-01-05 PROCEDURE — A9270 NON-COVERED ITEM OR SERVICE: HCPCS | Performed by: INTERNAL MEDICINE

## 2019-01-05 PROCEDURE — 700117 HCHG RX CONTRAST REV CODE 255: Performed by: HOSPITALIST

## 2019-01-05 PROCEDURE — 36415 COLL VENOUS BLD VENIPUNCTURE: CPT

## 2019-01-05 PROCEDURE — 84484 ASSAY OF TROPONIN QUANT: CPT

## 2019-01-05 PROCEDURE — 93010 ELECTROCARDIOGRAM REPORT: CPT | Performed by: INTERNAL MEDICINE

## 2019-01-05 PROCEDURE — 82306 VITAMIN D 25 HYDROXY: CPT

## 2019-01-05 PROCEDURE — 80053 COMPREHEN METABOLIC PANEL: CPT

## 2019-01-05 RX ORDER — ACETAMINOPHEN 325 MG/1
650 TABLET ORAL EVERY 4 HOURS PRN
Status: DISCONTINUED | OUTPATIENT
Start: 2019-01-05 | End: 2019-01-05 | Stop reason: HOSPADM

## 2019-01-05 RX ORDER — ERGOCALCIFEROL 1.25 MG/1
50000 CAPSULE ORAL
Status: DISCONTINUED | OUTPATIENT
Start: 2019-01-06 | End: 2019-01-05 | Stop reason: HOSPADM

## 2019-01-05 RX ORDER — REGADENOSON 0.08 MG/ML
INJECTION, SOLUTION INTRAVENOUS
Status: COMPLETED
Start: 2019-01-05 | End: 2019-01-05

## 2019-01-05 RX ADMIN — REGADENOSON 0.4 MG: 0.08 INJECTION, SOLUTION INTRAVENOUS at 10:01

## 2019-01-05 RX ADMIN — ACETAMINOPHEN 650 MG: 325 TABLET, FILM COATED ORAL at 11:41

## 2019-01-05 RX ADMIN — ASPIRIN 325 MG: 325 TABLET ORAL at 05:56

## 2019-01-05 RX ADMIN — IOHEXOL 60 ML: 350 INJECTION, SOLUTION INTRAVENOUS at 01:03

## 2019-01-05 ASSESSMENT — COPD QUESTIONNAIRES
COPD SCREENING SCORE: 1
DURING THE PAST 4 WEEKS HOW MUCH DID YOU FEEL SHORT OF BREATH: NONE/LITTLE OF THE TIME
HAVE YOU SMOKED AT LEAST 100 CIGARETTES IN YOUR ENTIRE LIFE: NO/DON'T KNOW
DO YOU EVER COUGH UP ANY MUCUS OR PHLEGM?: NO/ONLY WITH OCCASIONAL COLDS OR INFECTIONS

## 2019-01-05 ASSESSMENT — LIFESTYLE VARIABLES
EVER_SMOKED: NEVER
SUBSTANCE_ABUSE: 0
EVER_SMOKED: NEVER
ALCOHOL_USE: NO

## 2019-01-05 ASSESSMENT — ENCOUNTER SYMPTOMS
DIZZINESS: 0
COUGH: 0
CHILLS: 0
FEVER: 0
PALPITATIONS: 0
DOUBLE VISION: 0
SENSORY CHANGE: 0
NAUSEA: 1
SPEECH CHANGE: 0
BRUISES/BLEEDS EASILY: 0
HALLUCINATIONS: 0
EYE DISCHARGE: 0
VOMITING: 0
ABDOMINAL PAIN: 0
MYALGIAS: 0
SHORTNESS OF BREATH: 1
FOCAL WEAKNESS: 0
HEMOPTYSIS: 0
DEPRESSION: 0
WEAKNESS: 0
HEARTBURN: 0
BLURRED VISION: 0
FLANK PAIN: 0

## 2019-01-05 ASSESSMENT — PAIN SCALES - GENERAL
PAINLEVEL_OUTOF10: 0
PAINLEVEL_OUTOF10: 2
PAINLEVEL_OUTOF10: 0

## 2019-01-05 ASSESSMENT — PATIENT HEALTH QUESTIONNAIRE - PHQ9
SUM OF ALL RESPONSES TO PHQ9 QUESTIONS 1 AND 2: 0
1. LITTLE INTEREST OR PLEASURE IN DOING THINGS: NOT AT ALL
2. FEELING DOWN, DEPRESSED, IRRITABLE, OR HOPELESS: NOT AT ALL

## 2019-01-05 ASSESSMENT — PAIN SCALES - WONG BAKER
WONGBAKER_NUMERICALRESPONSE: DOESN'T HURT AT ALL
WONGBAKER_NUMERICALRESPONSE: DOESN'T HURT AT ALL

## 2019-01-05 NOTE — H&P
Hospital Medicine History & Physical Note    Date of Service  1/4/2019    Primary Care Physician  Maryan Little P.A.-C.    Consultants  Chest pain    Code Status  full    Chief Complaint  Chest pain     History of Presenting Illness  58 y.o. female who presented 1/4/2019 with chest pain.  This patient presents with sudden onset left-sided nonradiating chest pain.  Started acute onset around 3:30 PM.  This associated with burning sensation in the chest as well as nausea.  She also had some shortness of breath.  Alleviated with taking deep breath and raising her hands over her head.  She states that she does not feel right she continues to have some shortness of breath especially with exertion and generalized fatigue.  Chest pain at the time of my examination is completely resolved.  In the emergency department she had a initial troponin and EKG which were both unremarkable.  She does have a slightly elevated d-dimer and will be admitted for cardiac rule out as well as a CT angiogram of the chest.    Review of Systems  Review of Systems   Constitutional: Negative for chills and fever.   HENT: Negative for congestion, hearing loss and tinnitus.    Eyes: Negative for blurred vision, double vision and discharge.   Respiratory: Positive for shortness of breath. Negative for cough and hemoptysis.    Cardiovascular: Positive for chest pain. Negative for palpitations and leg swelling.   Gastrointestinal: Positive for nausea. Negative for abdominal pain, heartburn and vomiting.   Genitourinary: Negative for dysuria and flank pain.   Musculoskeletal: Negative for joint pain and myalgias.   Skin: Negative for rash.   Neurological: Negative for dizziness, sensory change, speech change, focal weakness and weakness.   Endo/Heme/Allergies: Negative for environmental allergies. Does not bruise/bleed easily.   Psychiatric/Behavioral: Negative for depression, hallucinations and substance abuse.       Past Medical History   has a  past medical history of Clavicle fracture and Rotator cuff tear arthropathy, right.    Surgical History   has a past surgical history that includes arthroplasty, shoulder (Right, 2016) and clavicle orif (2014).     Family History  Reviewed and noncontributory    Social History   reports that she has never smoked. She has never used smokeless tobacco. She reports that she does not drink alcohol or use drugs.    Allergies  No Known Allergies    Medications  Prior to Admission Medications   Prescriptions Last Dose Informant Patient Reported? Taking?   CALCIUM PO   Yes No   Sig: Take  by mouth.   Multiple Vitamins-Minerals (MULTIVITAL PO)   Yes No   Sig: Take  by mouth.   Omega-3 Fatty Acids (FISH OIL PO)   Yes No   Sig: Take  by mouth.   vitamin D, Ergocalciferol, (DRISDOL) 24729 units Cap capsule   No No   Sig: Take 1 Cap by mouth every 7 days.      Facility-Administered Medications: None       Physical Exam  Temp:  [36.6 °C (97.8 °F)-36.9 °C (98.4 °F)] 36.9 °C (98.4 °F)  Pulse:  [81-91] 87  Resp:  [16] 16  BP: (120-142)/(80-82) 120/80    Physical Exam   Constitutional: She is oriented to person, place, and time. She appears well-developed and well-nourished. No distress.   HENT:   Head: Normocephalic and atraumatic.   Eyes: Pupils are equal, round, and reactive to light. Conjunctivae and EOM are normal.   Neck: Normal range of motion. Neck supple. No JVD present.   Cardiovascular: Normal rate, regular rhythm, normal heart sounds and intact distal pulses.    No murmur heard.  Pulmonary/Chest: Effort normal and breath sounds normal. No respiratory distress. She has no wheezes.   Abdominal: Soft. Bowel sounds are normal. She exhibits no distension. There is no tenderness.   Musculoskeletal: Normal range of motion. She exhibits no edema.   Neurological: She is alert and oriented to person, place, and time. She exhibits normal muscle tone.   Skin: Skin is warm and dry.   Psychiatric: She has a normal mood and affect. Her  behavior is normal. Judgment and thought content normal.   Nursing note and vitals reviewed.      Laboratory:  Recent Labs      01/04/19   1806   WBC  7.0   RBC  4.91   HEMOGLOBIN  14.7   HEMATOCRIT  44.1   MCV  89.8   MCH  29.9   MCHC  33.3*   RDW  40.8   PLATELETCT  272   MPV  10.2     Recent Labs      01/04/19   1806   SODIUM  141   POTASSIUM  3.6   CHLORIDE  106   CO2  27   GLUCOSE  120*   BUN  19   CREATININE  1.08   CALCIUM  10.4     Recent Labs      01/04/19   1806   ALTSGPT  49   ASTSGOT  27   ALKPHOSPHAT  84   TBILIRUBIN  0.4   LIPASE  37   GLUCOSE  120*     Recent Labs      01/04/19   1806   APTT  26.8   INR  1.02     Recent Labs      01/04/19   1806   BNPBTYPENAT  8         Recent Labs      01/04/19   1806   TROPONINI  <0.01       Urinalysis:    No results found     Imaging:  DX-CHEST-LIMITED (1 VIEW)   Final Result      No acute cardiopulmonary abnormality.      NM-CARDIAC STRESS TEST    (Results Pending)   CT-CTA CHEST PULMONARY ARTERY W/ RECONS    (Results Pending)         Assessment/Plan:  I anticipate this patient is appropriate for observation status at this time.    * Pleuritic chest pain   Assessment & Plan    Patient presents with left-sided chest pain in the setting of history of obesity  Initial troponin EKG unremarkable  I will admit to cardiac telemetry  Serial ekg and troponins  Elevated d dimer check CTA   Cardiac stress if CTA unremarkable      Elevated d-dimer   Assessment & Plan    CTA chest r/o PE     Vitamin D deficiency- (present on admission)   Assessment & Plan    Resume vitamin D and check levels     Obesity (BMI 30-39.9)- (present on admission)   Assessment & Plan    Encouraged weight loss         VTE prophylaxis: heparin

## 2019-01-05 NOTE — DISCHARGE SUMMARY
Discharge Summary    CHIEF COMPLAINT ON ADMISSION  Chief Complaint   Patient presents with   • Chest Pain     today around 1530/ left side, nonradiating. pt reports sudden SOB/nausea that lasted around 3 min. symptoms relieved now but pt reports to not feel right.    • Shortness of Breath     Reason for Admission  Chest pain    Admission Date  1/4/2019    CODE STATUS  Full Code    HPI & HOSPITAL COURSE  This is a 58 y.o. female here with left-sided nonradiating chest pain that started approximately 3:30 PM on the day of presentation while she was at the [employer] with her grandkids.  She states she felt like she had a lump in her throat became short of breath.  She raised her arms above her head and leans her head back which she said resolved the chest pain and the knot in her throat but then brought on nausea.  It was then that she sought emergency evaluation.  Her EKG was unremarkable on admit, LDL mildly elevated at 113, troponins negative, BNP normal, and her thyroid function was normal.  Her d-dimer was a little elevated so a CTA of her chest was done and was negative for pulmonary embolism.  A cardiac stress test was also completed and was normal.  She has had no further episodes, her vital signs have remained stable, and she is anxious to get home today.  She agrees to follow-up with her primary care physician in 1-2 weeks.     Therefore, she is discharged in good and stable condition to home with close outpatient follow-up.    Discharge Date  1/5/2019    FOLLOW UP ITEMS POST DISCHARGE  PCP in 1-2 weeks    DISCHARGE DIAGNOSES  Principal Problem:    Pleuritic chest pain POA: Yes  Active Problems:    Obesity (BMI 30-39.9) POA: Yes    Vitamin D deficiency POA: Yes    Elevated d-dimer POA: Yes  Resolved Problems:    * No resolved hospital problems. *    FOLLOW UP  Future Appointments  Date Time Provider Department Center   1/11/2019 3:40 PM Dorothy Elizalde M.D. [facility]     MEDICATIONS ON  DISCHARGE     Medication List      CONTINUE taking these medications      Instructions   CALCIUM PO   Take 1 tablet by mouth every day.  Dose:  1 tablet     FISH OIL PO   Take 1,000 mg by mouth every day.  Dose:  1000 mg     MULTIVITAL PO   Take 1 tablet by mouth every day.  Dose:  1 tablet     vitamin D (Ergocalciferol) 62625 units Caps capsule  Commonly known as:  DRISDOL   Take 1 Cap by mouth every 7 days.  Dose:  36275 Units          Allergies  No Known Allergies    DIET  Orders Placed This Encounter   Procedures   • Diet Order Cardiac     Standing Status:   Standing     Number of Occurrences:   1     Order Specific Question:   Diet:     Answer:   Cardiac [6]     ACTIVITY  As tolerated.  Exercise encouraged.    CONSULTATIONS  None    PROCEDURES  None    LABORATORY  Lab Results   Component Value Date    SODIUM 138 01/05/2019    POTASSIUM 3.8 01/05/2019    CHLORIDE 106 01/05/2019    CO2 24 01/05/2019    GLUCOSE 94 01/05/2019    BUN 20 01/05/2019    CREATININE 0.93 01/05/2019      Lab Results   Component Value Date    WBC 7.0 01/05/2019    HEMOGLOBIN 13.7 01/05/2019    HEMATOCRIT 41.3 01/05/2019    PLATELETCT 267 01/05/2019      Total time of the discharge process exceeds 36 minutes.

## 2019-01-05 NOTE — ED TRIAGE NOTES
Chief Complaint   Patient presents with   • Chest Pain     today around 1530/ left side, nonradiating. pt reports sudden SOB/nausea that lasted around 3 min. symptoms relieved now but pt reports to not feel right.    • Shortness of Breath     Explained to pt triage process, made pt aware to tell this RN/staff of any changes/concerns, pt verbalized understanding of process and instructions given. Pt to ER lobby.

## 2019-01-05 NOTE — CARE PLAN
Problem: Safety  Goal: Will remain free from injury  Outcome: PROGRESSING AS EXPECTED  Anticipate patient's needs, keep belongings within reach.    Problem: Pain Management  Goal: Pain level will decrease to patient's comfort goal  Outcome: PROGRESSING AS EXPECTED

## 2019-01-05 NOTE — DISCHARGE INSTRUCTIONS
Discharge Instructions    Discharged to home by car with relative. Discharged via wheelchair, hospital escort: Yes.  Special equipment needed: Not Applicable    Be sure to schedule a follow-up appointment with your primary care doctor or any specialists as instructed.     Discharge Plan:   Diet Plan: Discussed  Activity Level: Discussed  Confirmed Follow up Appointment: Patient to Call and Schedule Appointment  Confirmed Symptoms Management: Discussed  Medication Reconciliation Updated: Yes  Influenza Vaccine Indication: Patient Refuses    I understand that a diet low in cholesterol, fat, and sodium is recommended for good health. Unless I have been given specific instructions below for another diet, I accept this instruction as my diet prescription.   Other diet: Regular    Special Instructions: None    · Is patient discharged on Warfarin / Coumadin?   No     Depression / Suicide Risk    As you are discharged from this [facility] facility, it is important to learn how to keep safe from harming yourself.    Recognize the warning signs:  · Abrupt changes in personality, positive or negative- including increase in energy   · Giving away possessions  · Change in eating patterns- significant weight changes-  positive or negative  · Change in sleeping patterns- unable to sleep or sleeping all the time   · Unwillingness or inability to communicate  · Depression  · Unusual sadness, discouragement and loneliness  · Talk of wanting to die  · Neglect of personal appearance   · Rebelliousness- reckless behavior  · Withdrawal from people/activities they love  · Confusion- inability to concentrate     If you or a loved one observes any of these behaviors or has concerns about self-harm, here's what you can do:  · Talk about it- your feelings and reasons for harming yourself  · Remove any means that you might use to hurt yourself (examples: pills, rope, extension cords, firearm)  · Get professional help from the community  (Mental Health, Substance Abuse, psychological counseling)  · Do not be alone:Call your Safe Contact- someone whom you trust who will be there for you.  · Call your local CRISIS HOTLINE 171-9070 or 536-876-9339  · Call your local Children's Mobile Crisis Response Team [place] (399) 841-8237 or www.Xylo  · Call the toll free National Suicide Prevention Hotlines   · National Suicide Prevention Lifeline 658-328-MNUK (5627)  · National Hope Line Network 800-SUICIDE (136-5752)

## 2019-01-05 NOTE — ED PROVIDER NOTES
"ED PROVIDER NOTE    Scribed for Heather Jaime M.D. by Marina Bobo. 1/4/2019  10:01 PM    Means of arrival: walk-in  History obtained from: patient  History limited by: none    CHIEF COMPLAINT  Chief Complaint   Patient presents with   • Chest Pain     today around 1530/ left side, nonradiating. pt reports sudden SOB/nausea that lasted around 3 min. symptoms relieved now but pt reports to not feel right.    • Shortness of Breath       HPI  Dominga Hatfield is a 58 y.o. female who presents with sudden onset left sided non- radiating chest pain that began around 330PM this afternoon with associated difficulty breathing and nausea. When these symptoms first began, that patient was sitting watching her children play. She then stood and began to walk, gradually feeling better. The chest pain resolved, however, the patient is still experiencing some shortness of breath when she talks and states \"I just don't feel right\" describing some generalized fatigue. Patient has never experienced symptoms such as this in the past, and the chest pain only lasted 5 minutes before resolving. Patient denies any recent illness. No history of hypertension, diabetes, smoking, blood clots, family history of early cardiac death, heart attack. No complaints of leg swelling, fevers.    REVIEW OF SYSTEMS  See HPI for further details. All other systems are negative.     PAST MEDICAL HISTORY   has a past medical history of Clavicle fracture and Rotator cuff tear arthropathy, right.    SOCIAL HISTORY  Social History     Social History Main Topics   • Smoking status: Never Smoker   • Smokeless tobacco: Never Used   • Alcohol use No   • Drug use: No       SURGICAL HISTORY   has a past surgical history that includes arthroplasty, shoulder (Right, 2016) and clavicle orif (2014).    CURRENT MEDICATIONS    Current Facility-Administered Medications:   •  aspirin (ASA) chewable tab 324 mg, 324 mg, Oral, Once, Heather Jaime M.D.    Current " "Outpatient Prescriptions:   •  vitamin D, Ergocalciferol, (DRISDOL) 92975 units Cap capsule, Take 1 Cap by mouth every 7 days., Disp: 12 Cap, Rfl: 0  •  Multiple Vitamins-Minerals (MULTIVITAL PO), Take  by mouth., Disp: , Rfl:   •  Omega-3 Fatty Acids (FISH OIL PO), Take  by mouth., Disp: , Rfl:   •  CALCIUM PO, Take  by mouth., Disp: , Rfl:     ALLERGIES  No Known Allergies    PHYSICAL EXAM  VITAL SIGNS: /80   Pulse 83   Temp 36.9 °C (98.4 °F) (Temporal)   Resp 16   Ht 1.626 m (5' 4\")   Wt 77.6 kg (171 lb)   SpO2 93%   BMI 29.35 kg/m²     Pulse ox interpretation: I interpret this pulse ox as normal.  Constitutional: Alert in no apparent distress, obese  HENT: Normocephalic, atraumatic. Bilateral external ears normal, Nose normal. Moist mucous membranes.  Eyes: Pupils are equal and reactive, Conjunctiva normal.   Neck: Normal range of motion, Supple, non-tender.   Lymphatic: No lymphadenopathy noted.   Cardiovascular: normal rate and rhythm, no murmurs. Distal pulses intact.  No peripheral edema. 2cm JVD  Thorax & Lungs: normal breath sounds.  no wheezing/rales/ronchi. no increased work of breathing  Abdomen: Soft, obese, non-distended, non tender to palpation. No palpable or pulsatile masses. no peritoneal signs. no CVA tenderness.  Skin: Warm, Dry, No erythema, no rash.   Musculoskeletal: Good range of motion in all major joints. No major deformities noted.  Neurologic:  Alert, EOMI, fluent speech, tongue midline, no facial droop, 5/5 strength BUE and BLE. Grossly intact neurologic exam.   Psychiatric: Affect normal, Judgment normal, Mood normal.       DIAGNOSTIC STUDIES / PROCEDURES    EKG  As below    LABS  Results for orders placed or performed during the hospital encounter of 01/04/19   Troponin   Result Value Ref Range    Troponin I <0.01 0.00 - 0.04 ng/mL   Btype Natriuretic Peptide   Result Value Ref Range    B Natriuretic Peptide 8 0 - 100 pg/mL   CBC with Differential   Result Value Ref Range "    WBC 7.0 4.8 - 10.8 K/uL    RBC 4.91 4.20 - 5.40 M/uL    Hemoglobin 14.7 12.0 - 16.0 g/dL    Hematocrit 44.1 37.0 - 47.0 %    MCV 89.8 81.4 - 97.8 fL    MCH 29.9 27.0 - 33.0 pg    MCHC 33.3 (L) 33.6 - 35.0 g/dL    RDW 40.8 35.9 - 50.0 fL    Platelet Count 272 164 - 446 K/uL    MPV 10.2 9.0 - 12.9 fL    Neutrophils-Polys 67.70 44.00 - 72.00 %    Lymphocytes 24.30 22.00 - 41.00 %    Monocytes 6.00 0.00 - 13.40 %    Eosinophils 1.10 0.00 - 6.90 %    Basophils 0.60 0.00 - 1.80 %    Immature Granulocytes 0.30 0.00 - 0.90 %    Nucleated RBC 0.00 /100 WBC    Neutrophils (Absolute) 4.74 2.00 - 7.15 K/uL    Lymphs (Absolute) 1.70 1.00 - 4.80 K/uL    Monos (Absolute) 0.42 0.00 - 0.85 K/uL    Eos (Absolute) 0.08 0.00 - 0.51 K/uL    Baso (Absolute) 0.04 0.00 - 0.12 K/uL    Immature Granulocytes (abs) 0.02 0.00 - 0.11 K/uL    NRBC (Absolute) 0.00 K/uL   Complete Metabolic Panel (CMP)   Result Value Ref Range    Sodium 141 135 - 145 mmol/L    Potassium 3.6 3.6 - 5.5 mmol/L    Chloride 106 96 - 112 mmol/L    Co2 27 20 - 33 mmol/L    Anion Gap 8.0 0.0 - 11.9    Glucose 120 (H) 65 - 99 mg/dL    Bun 19 8 - 22 mg/dL    Creatinine 1.08 0.50 - 1.40 mg/dL    Calcium 10.4 8.5 - 10.5 mg/dL    AST(SGOT) 27 12 - 45 U/L    ALT(SGPT) 49 2 - 50 U/L    Alkaline Phosphatase 84 30 - 99 U/L    Total Bilirubin 0.4 0.1 - 1.5 mg/dL    Albumin 4.4 3.2 - 4.9 g/dL    Total Protein 7.8 6.0 - 8.2 g/dL    Globulin 3.4 1.9 - 3.5 g/dL    A-G Ratio 1.3 g/dL   Prothrombin Time   Result Value Ref Range    PT 13.5 12.0 - 14.6 sec    INR 1.02 0.87 - 1.13   APTT   Result Value Ref Range    APTT 26.8 24.7 - 36.0 sec   Lipase   Result Value Ref Range    Lipase 37 11 - 82 U/L   ESTIMATED GFR   Result Value Ref Range    GFR If African American >60 >60 mL/min/1.73 m 2    GFR If Non African American 52 (A) >60 mL/min/1.73 m 2   EKG (NOW)   Result Value Ref Range    Report       [facility] Emergency Dept.    Test Date:  2019-01-04  Pt Name:     JONO BRODERICK           Department: ER  MRN:        [id number]                      Room:  Gender:     Female                       Technician: [id number]  :        [id number]                   Requested By:ER TRIAGE PROTOCOL  Order #:    [id number]                    Reading MD: Heather Jaime MD    Measurements  Intervals                                Axis  Rate:       70                           P:          41  NE:         160                          QRS:        -32  QRSD:       80                           T:          20  QT:         372  QTc:        402    Interpretive Statements  SINUS RHYTHM  LEFT AXIS DEVIATION  No previous ECG available for comparison    Electronically Signed On 2019 22:06:48 PST by Heather Jaime MD       RADIOLOGY  DX-CHEST-LIMITED (1 VIEW)   Final Result      No acute cardiopulmonary abnormality.          COURSE & MEDICAL DECISION MAKING  Nursing notes and vital signs were reviewed. (See chart for details)  The patient's records were reviewed, history was obtained from the patient.    10:01 PM - Patient seen and examined at bedside. Discussed plan of care, including evaluating with EKG, lab work and chest xray for any acute processes. Patient agrees to the plan of care. The patient will be medicated with 324mg oral Aspirin. Ordered for chest xray, estimated GFR, troponin, BNP, CBC, CMP, PT, APTT, lipase and EKG to evaluate her symptoms. Differential diagnoses include but not limited to: MI, PE, ACS. I informed the patient that her work up so far is negative. I explained that secondary to her risk factors, however, I would like to admit her for further evaluation. Patient would like a moment to think about it.    Heart score of 4 (points for history, age, risk factors) with 12% risk of cardiac event within the next month. EKG normal with no indications on lab work of acute cardiac process or infection.    11:00 PM Patient agrees to admission for cardiac risk stratification  testing.  Given her mild hypoxia (93%) and her feeling of persistent shortness of breath, d-dimer was added to her labs.  Spoke with Dr. Keita, Hospitalist, about the patient's condition.  Agrees to admit patient for further evaluation of her symptoms.  D-dimer pending at this time.    DISPOSITION:  Patient will be admitted to Dr. Keita, Hospitalist in guarded condition.    FINAL IMPRESSION  (R07.9) Chest pain, unspecified type  (R06.00) Dyspnea, unspecified type     I, Marina Bobo (Scribe), am scribing for, and in the presence of, Heather Jaime M.D..    Electronically signed by: Marina Bobo (Scribe), 1/4/2019    I, Heather Jaime M.D. personally performed the services described in this documentation, as scribed by Marina Bobo in my presence, and it is both accurate and complete.    The note accurately reflects work and decisions made by me.  Heather Jaime  1/5/2019  1:04 AM    This dictation was created using voice recognition software. The accuracy of the dictation is limited to the abilities of the software. I expect there may be some errors of grammar and possibly content. The nursing notes were reviewed and certain aspects of this information were incorporated into this note.

## 2019-01-05 NOTE — PROGRESS NOTES
Transported  from yellow  pod and ct scan to cdu, aox4, sr on monitor, steady on her  feet. Denies pain or sob. Call light within reach. Needs attended. Plan of care discussed and understood.

## 2019-01-05 NOTE — ASSESSMENT & PLAN NOTE
Patient presents with left-sided chest pain in the setting of history of obesity  Initial troponin EKG unremarkable  I will admit to cardiac telemetry  Serial ekg and troponins  Elevated d dimer check CTA   Cardiac stress if CTA unremarkable

## 2019-01-05 NOTE — PROGRESS NOTES
Report received, pt care assumed. Pt sleeping, rise and fall of chest observed. Call light within reach of pt. Will continue to monitor.

## 2019-02-06 ENCOUNTER — TELEPHONE (OUTPATIENT)
Dept: MEDICAL GROUP | Facility: PHYSICIAN GROUP | Age: 59
End: 2019-02-06

## 2019-02-06 DIAGNOSIS — Z01.00 EYE EXAM, ROUTINE: ICD-10-CM

## 2019-02-06 NOTE — TELEPHONE ENCOUNTER
1. Caller Name: Dominga Hatfield                                            Call Back Number: [phone] (home)         Patient approves a detailed voicemail message: N\A    2. SPECIFIC Action To Be Taken: Referral to [facility] needed    3. Diagnosis/Clinical Reason for Request: Yearly check up     4. Specialty & Provider Name/Lab/Imaging Location: [facility]    5. Is appointment scheduled for requested order/referral: no    Patient was informed they will receive a return phone call from the office ONLY if there are any questions before processing their request. Advised to call back if they haven't received a call from the referral department in 5 days.

## 2020-01-15 ENCOUNTER — OFFICE VISIT (OUTPATIENT)
Dept: MEDICAL GROUP | Facility: PHYSICIAN GROUP | Age: 60
End: 2020-01-15
Payer: OTHER GOVERNMENT

## 2020-01-15 VITALS
DIASTOLIC BLOOD PRESSURE: 82 MMHG | HEIGHT: 65 IN | HEART RATE: 76 BPM | WEIGHT: 167 LBS | TEMPERATURE: 98.2 F | RESPIRATION RATE: 16 BRPM | OXYGEN SATURATION: 97 % | SYSTOLIC BLOOD PRESSURE: 122 MMHG | BODY MASS INDEX: 27.82 KG/M2

## 2020-01-15 DIAGNOSIS — M25.512 CHRONIC PAIN OF BOTH SHOULDERS: ICD-10-CM

## 2020-01-15 DIAGNOSIS — Z98.890 HISTORY OF REPAIR OF RIGHT ROTATOR CUFF: ICD-10-CM

## 2020-01-15 DIAGNOSIS — G89.29 CHRONIC PAIN OF BOTH SHOULDERS: ICD-10-CM

## 2020-01-15 DIAGNOSIS — M25.511 CHRONIC PAIN OF BOTH SHOULDERS: ICD-10-CM

## 2020-01-15 PROCEDURE — 99213 OFFICE O/P EST LOW 20 MIN: CPT | Performed by: PHYSICIAN ASSISTANT

## 2020-01-15 ASSESSMENT — PATIENT HEALTH QUESTIONNAIRE - PHQ9: CLINICAL INTERPRETATION OF PHQ2 SCORE: 0

## 2020-01-15 NOTE — PROGRESS NOTES
Subjective:   Dominga Hatfield is a 59 y.o. female here today for bilateral shoulder pain, referral request. Is an established patient of mine.    HPI:    Camila presents to the office today requesting referral to [facility]. She states that she has been having ongoing issues with her shoulders for years now.  Approximately 6 years ago when she was living in [place], she was diagnosed with rotator cuff tears of both shoulders, complete on the right and partial on the left.  She underwent rotator cuff repair of only the right shoulder but has continued to have pain and tightness since that surgery which she feels is progressively worsening.  Her left shoulder was not repaired surgically, but she recalls her orthopedist at the time telling her that she would likely require surgery at some point.  About a year and a half after her rotator cuff surgery of her right shoulder, she broke her right clavicle after getting kicked off of a horse, and has had some pain in that area as well which she feels may be contributing to the shoulder pain.  Her range of motion is intact, but she often has to stretch out her right shoulder before doing any kind of significant upper extremity work.  The left shoulder pain is more sporadic.  She states that when she reaches or turns a certain way with her shoulder, she will sometimes feel a catching sensation and she will physically have to  her entire arm in order to bring her arm back to her body.  She has been taking over-the-counter pain medication as needed--either Tylenol or Advil.  Also ices consistently.    Current medicines (including changes today)  Current Outpatient Medications   Medication Sig Dispense Refill   • vitamin D, Ergocalciferol, (DRISDOL) 71153 units Cap capsule Take 1 Cap by mouth every 7 days. 12 Cap 0   • Multiple Vitamins-Minerals (MULTIVITAL PO) Take 1 tablet by mouth every day.     • Omega-3 Fatty Acids (FISH OIL PO) Take 1,000 mg by mouth every day.     •  "CALCIUM PO Take 1 tablet by mouth every day.       No current facility-administered medications for this visit.      She  has a past medical history of Clavicle fracture and Rotator cuff tear arthropathy, right.    ROS  As per HPI.     Objective:     /82 (BP Location: Right arm, Patient Position: Sitting)   Pulse 76   Temp 36.8 °C (98.2 °F)   Resp 16   Ht 1.638 m (5' 4.5\")   Wt 75.8 kg (167 lb)   SpO2 97%  Body mass index is 28.22 kg/m².     Physical Exam:  Constitutional: Alert, well-appearing, no distress.  Skin: No rashes in visible areas.  Eye: Pupils are equal and round, conjunctiva clear, lids normal.  ENMT: Lips without lesions, moist mucus membranes.  Neck: Normal-appearing.  Musculoskeletal: Well-healed surgical scar noted on the right clavicle. Fully intact bilateral shoulder ROM but complains of tightness on the right.      Assessment and Plan:   The following treatment plan was discussed    1. Chronic pain of both shoulders  2. History of repair of right rotator cuff  New problem to me, uncontrolled. Given h/o RC tears and prior surgery, agree that she should be evaluated by orthopedics. Will need updated MRIs but will defer to orthopedics. Referral placed to patient's orthopedist of choice, Dr. Rincon.  - REFERRAL TO ORTHOPEDICS      Followup: Return for f/u with orthopedics.    Maryan Little P.A.-C.             "

## 2020-08-26 ENCOUNTER — HOSPITAL ENCOUNTER (OUTPATIENT)
Dept: RADIOLOGY | Facility: MEDICAL CENTER | Age: 60
End: 2020-08-26
Attending: PHYSICIAN ASSISTANT
Payer: OTHER GOVERNMENT

## 2020-08-26 DIAGNOSIS — Z12.31 VISIT FOR SCREENING MAMMOGRAM: ICD-10-CM

## 2020-08-26 PROCEDURE — 77067 SCR MAMMO BI INCL CAD: CPT

## 2020-09-16 ENCOUNTER — OFFICE VISIT (OUTPATIENT)
Dept: MEDICAL GROUP | Facility: PHYSICIAN GROUP | Age: 60
End: 2020-09-16
Payer: OTHER GOVERNMENT

## 2020-09-16 VITALS
TEMPERATURE: 97.8 F | BODY MASS INDEX: 28.82 KG/M2 | DIASTOLIC BLOOD PRESSURE: 60 MMHG | SYSTOLIC BLOOD PRESSURE: 118 MMHG | HEIGHT: 65 IN | HEART RATE: 77 BPM | OXYGEN SATURATION: 96 % | WEIGHT: 173 LBS

## 2020-09-16 DIAGNOSIS — D22.9 NEVUS: ICD-10-CM

## 2020-09-16 DIAGNOSIS — Z12.11 SCREENING FOR COLORECTAL CANCER: ICD-10-CM

## 2020-09-16 DIAGNOSIS — Z12.12 SCREENING FOR COLORECTAL CANCER: ICD-10-CM

## 2020-09-16 PROBLEM — R07.81 PLEURITIC CHEST PAIN: Status: RESOLVED | Noted: 2019-01-05 | Resolved: 2020-09-16

## 2020-09-16 PROBLEM — R79.89 ELEVATED D-DIMER: Status: RESOLVED | Noted: 2019-01-05 | Resolved: 2020-09-16

## 2020-09-16 PROCEDURE — 99214 OFFICE O/P EST MOD 30 MIN: CPT | Performed by: FAMILY MEDICINE

## 2020-09-16 ASSESSMENT — FIBROSIS 4 INDEX: FIB4 SCORE: 0.86

## 2020-09-16 NOTE — PROGRESS NOTES
"CC: Mole    HISTORY OF THE PRESENT ILLNESS: Patient is a 60 y.o. female. This pleasant patient is here today to establish care.    Patient is here today to establish care.  Her primary concern is to have a mole removed.  She has a mole on her left deltoid area which has been present for very long time.  It is raised and bothersome to the patient.  It has not been changing in color, shape size or scabbing over.  She just desires removal.    Other than that, patient has no concerns.  She does plan to get her flu shot this year.  She declines colonoscopy but is open to FIT testing.  She is up-to-date on mammogram.  She declined preventative lab work during today's visit.    Allergies: Patient has no known allergies.    No current Repair Report-ordered outpatient medications on file.     No current Repair Report-ordered facility-administered medications on file.        Past Medical History:   Diagnosis Date   • Clavicle fracture    • Rotator cuff tear arthropathy, right        Past Surgical History:   Procedure Laterality Date   • ARTHROPLASTY, SHOULDER Right 2016   • CLAVICLE ORIF  2014       Social History     Tobacco Use   • Smoking status: Never Smoker   • Smokeless tobacco: Never Used   Substance Use Topics   • Alcohol use: No   • Drug use: No       Social History     Social History Narrative   • Not on file       Family History   Family history unknown: Yes       ROS:     - Constitutional: Negative for fever, chills, unexpected weight change, and fatigue/generalized weakness.     - Respiratory: Negative for cough, sputum production, chest congestion, dyspnea, wheezing, and crackles.      - Cardiovascular: Negative for chest pain, palpitations, orthopnea, PND, and bilateral lower extremity edema.       Labs: Labs reviewed from January 5, 2019.    Exam: /60 (BP Location: Left arm, Patient Position: Sitting, BP Cuff Size: Adult)   Pulse 77   Temp 36.6 °C (97.8 °F) (Temporal)   Ht 1.638 m (5' 4.5\")   Wt 78.5 kg (173 lb)   " SpO2 96%  Body mass index is 29.24 kg/m².    General: Well appearing, NAD  Pulmonary: Clear to ausculation.  Normal effort. No rales, ronchi, or wheezing.  Cardiovascular: Regular rate and rhythm without murmur, rubs or gallop.   Skin: Warm and dry.  Raised nevus approximately half centimeter in diameter noted on left arm.  Musculoskeletal:  No extremity cyanosis, clubbing, or edema.  Psych: Normal mood and affect. Alert and oriented. Judgment and insight is normal.    Please note that this dictation was created using voice recognition software. I have made every reasonable attempt to correct obvious errors, but I expect that there are errors of grammar and possibly content that I did not discover before finalizing the note.      Assessment/Plan  Dominga was seen today for establish care and nevus.    Diagnoses and all orders for this visit:    Screening for colorectal cancer  -     OCCULT BLOOD FECES IMMUNOASSAY (FIT); Future    Nevus  Chronic issue for patient, new issue to me.  We will get her back on the schedule for a shave biopsy.  The mole appears to be benign, but patient desires removal.    Follow-up in 1 to 2 weeks for mole removal.    Rupa Cartagena,   [facility]

## 2020-09-23 ENCOUNTER — OFFICE VISIT (OUTPATIENT)
Dept: MEDICAL GROUP | Facility: PHYSICIAN GROUP | Age: 60
End: 2020-09-23
Payer: OTHER GOVERNMENT

## 2020-09-23 ENCOUNTER — APPOINTMENT (OUTPATIENT)
Dept: RADIOLOGY | Facility: IMAGING CENTER | Age: 60
End: 2020-09-23
Attending: FAMILY MEDICINE
Payer: OTHER GOVERNMENT

## 2020-09-23 ENCOUNTER — HOSPITAL ENCOUNTER (OUTPATIENT)
Facility: MEDICAL CENTER | Age: 60
End: 2020-09-23
Attending: FAMILY MEDICINE
Payer: OTHER GOVERNMENT

## 2020-09-23 VITALS
OXYGEN SATURATION: 99 % | TEMPERATURE: 98.6 F | WEIGHT: 160 LBS | HEART RATE: 82 BPM | HEIGHT: 65 IN | SYSTOLIC BLOOD PRESSURE: 118 MMHG | BODY MASS INDEX: 26.66 KG/M2 | DIASTOLIC BLOOD PRESSURE: 62 MMHG

## 2020-09-23 DIAGNOSIS — M25.511 RIGHT SHOULDER PAIN, UNSPECIFIED CHRONICITY: ICD-10-CM

## 2020-09-23 DIAGNOSIS — D22.9 ATYPICAL MOLE: ICD-10-CM

## 2020-09-23 DIAGNOSIS — R15.9 INCONTINENCE OF FECES, UNSPECIFIED FECAL INCONTINENCE TYPE: ICD-10-CM

## 2020-09-23 DIAGNOSIS — Z23 NEED FOR VACCINATION: ICD-10-CM

## 2020-09-23 LAB — PATHOLOGY CONSULT NOTE: NORMAL

## 2020-09-23 PROCEDURE — 88305 TISSUE EXAM BY PATHOLOGIST: CPT

## 2020-09-23 PROCEDURE — 11102 TANGNTL BX SKIN SINGLE LES: CPT | Performed by: FAMILY MEDICINE

## 2020-09-23 PROCEDURE — 90471 IMMUNIZATION ADMIN: CPT | Performed by: FAMILY MEDICINE

## 2020-09-23 PROCEDURE — 73030 X-RAY EXAM OF SHOULDER: CPT | Mod: TC,RT | Performed by: FAMILY MEDICINE

## 2020-09-23 PROCEDURE — 90686 IIV4 VACC NO PRSV 0.5 ML IM: CPT | Performed by: FAMILY MEDICINE

## 2020-09-23 PROCEDURE — 99214 OFFICE O/P EST MOD 30 MIN: CPT | Mod: 25 | Performed by: FAMILY MEDICINE

## 2020-09-23 ASSESSMENT — FIBROSIS 4 INDEX: FIB4 SCORE: 0.86

## 2020-09-24 NOTE — PROGRESS NOTES
"CC: Atypical mole    HISTORY OF THE PRESENT ILLNESS: Patient is a 60 y.o. female. This pleasant patient is here today for shave biopsy.    Patient was scheduled here during today's appointment for a shave biopsy.  She has a mole on her left lateral shoulder which she would like removed.    Patient has several other concerns.  First of all, she has become all of a sudden incontinent of stool in the past 4 to 6 weeks or so.  She is also reporting significant fecal urgency.  She denies any abdominal pain.  She has no nausea, vomiting, melena, blood in the stool.  She has never had issues with any fecal incontinence in the past.  States that at times it is been quite severe that she loses all the stool in her bowels, even in public.    She is also concerned about right shoulder pain.  She has a history of clavicle ORIF.  Notes that she has had chronic pain in her shoulder ever since that time.  She is concerned that something \"may still be broken\" or that the hardware may be out of place.    Allergies: Patient has no known allergies.    No current [facility]-ordered outpatient medications on file.     No current [facility]-ordered facility-administered medications on file.        Past Medical History:   Diagnosis Date   • Clavicle fracture    • Rotator cuff tear arthropathy, right        Past Surgical History:   Procedure Laterality Date   • ARTHROPLASTY, SHOULDER Right 2016   • CLAVICLE ORIF  2014       Social History     Tobacco Use   • Smoking status: Never Smoker   • Smokeless tobacco: Never Used   Substance Use Topics   • Alcohol use: No   • Drug use: No       Social History     Social History Narrative   • Not on file       Family History   Family history unknown: Yes       ROS:     - Constitutional: Negative for fever, chills, unexpected weight change, and fatigue/generalized weakness.     - Respiratory: Negative for cough, sputum production, chest congestion, dyspnea, wheezing, and crackles.      - Cardiovascular: Negative " "for chest pain, palpitations, orthopnea, PND, and bilateral lower extremity edema.     Exam: /62 (BP Location: Left arm, Patient Position: Sitting, BP Cuff Size: Adult)   Pulse 82   Temp 37 °C (98.6 °F) (Temporal)   Ht 1.638 m (5' 4.5\")   Wt 72.6 kg (160 lb)   SpO2 99%  Body mass index is 27.04 kg/m².    General: Well appearing, NAD  Skin: Warm and dry.  No obvious lesions.  Musculoskeletal:  No extremity cyanosis, clubbing, or edema.  Psych: Normal mood and affect. Alert and oriented. Judgment and insight is normal.    Please note that this dictation was created using voice recognition software. I have made every reasonable attempt to correct obvious errors, but I expect that there are errors of grammar and possibly content that I did not discover before finalizing the note.      Assessment/Plan  Dominga was seen today for procedure.    Diagnoses and all orders for this visit:    Atypical mole  Shave biopsy performed as below.  -     Consent for all Surgical, Special Diagnostic or Therapeutic Procedures  -     Pathology Specimen; Future    PROCEDURE NOTE, SHAVE BIOPSY    Indication: Atypical nevus    Location of lesion to be excised: Left shoulder      History of allergy to iodine: no     The risks (including bleeding and infection) and benefits of the   procedure and written informed consent obtained.     Local anesthesia was achieved with    Lidocaine 2% with epinephrine  without added sodium bicarbonate, prepped with povidone iodine, and   draped in the usual sterile fashion.  A scalpel was used to shave an   area of skin approximately 1cm by   1cm.  Hemostasis achieved with pressure and brief electrocautery and a sterile   dressing applied.  The specimen was sent for pathologic   examination.     The patient tolerated the procedure well and without apparent   complications.     The patient was instructed to keep the wound dry and covered for   24-48h and clean thereafter, and warning signs of " infection were   reviewed.    Followup for any concerns.    Need for vaccination  -     Influenza Vaccine Quad Injection (PF)    Incontinence of feces, unspecified fecal incontinence type  We had fairly limited time during her visit today to fully address this issue.  However, I definitely think that this warrants evaluation by gastroenterology.  She is 60 and has never had a colonoscopy.  Now she is having sudden onset fecal urgency and incontinence.  She is agreeable to referral.  -     REFERRAL TO GASTROENTEROLOGY    Right shoulder pain, unspecified chronicity  Patient is chronic right shoulder pain with history of clavicle ORIF and pin placement.  Again, was limited time during today's visit as this was scheduled as a procedure, I will go ahead and order shoulder x-ray for initial evaluation.  If abnormal, will likely refer to orthopedics.  -     DX-SHOULDER 2+ RIGHT; Future      Follow-up if symptoms not improving or in 1 year.    Rupa Cartagena, DO  [facility]

## 2020-09-27 ENCOUNTER — HOSPITAL ENCOUNTER (OUTPATIENT)
Facility: MEDICAL CENTER | Age: 60
End: 2020-09-27
Attending: FAMILY MEDICINE
Payer: OTHER GOVERNMENT

## 2020-09-27 PROCEDURE — 82274 ASSAY TEST FOR BLOOD FECAL: CPT

## 2020-10-05 DIAGNOSIS — Z12.11 SCREENING FOR COLORECTAL CANCER: ICD-10-CM

## 2020-10-05 DIAGNOSIS — Z12.12 SCREENING FOR COLORECTAL CANCER: ICD-10-CM

## 2020-10-06 LAB — HEMOCCULT STL QL IA: NEGATIVE

## 2020-12-21 ENCOUNTER — NON-PROVIDER VISIT (OUTPATIENT)
Dept: MEDICAL GROUP | Facility: PHYSICIAN GROUP | Age: 60
End: 2020-12-21
Payer: OTHER GOVERNMENT

## 2020-12-21 ENCOUNTER — APPOINTMENT (OUTPATIENT)
Dept: MEDICAL GROUP | Facility: PHYSICIAN GROUP | Age: 60
End: 2020-12-21
Payer: OTHER GOVERNMENT

## 2020-12-21 DIAGNOSIS — Z23 NEED FOR VACCINATION: ICD-10-CM

## 2020-12-21 PROCEDURE — 90471 IMMUNIZATION ADMIN: CPT | Performed by: NURSE PRACTITIONER

## 2020-12-21 PROCEDURE — 90750 HZV VACC RECOMBINANT IM: CPT | Performed by: NURSE PRACTITIONER

## 2020-12-21 NOTE — PROGRESS NOTES
"Camila Hatfield is a 60 y.o. female here for a non-provider visit for:   SHINGRIX (Shingles)    Reason for immunization: Overdue/Provider Recommended  Immunization records indicate need for vaccine: Yes, confirmed with Epic  Minimum interval has been met for this vaccine: Yes  ABN completed: Not Indicated    Order and dose verified by: Lyn LUNDBERG was given to patient: Yes  All IAC Questionnaire questions were answered \"No.\"    Patient tolerated injection and no adverse effects were observed or reported: Yes    Pt scheduled for next dose in series: Yes pt will walk in for an MA visit   "

## 2021-03-15 DIAGNOSIS — Z23 NEED FOR VACCINATION: ICD-10-CM

## 2021-03-16 ENCOUNTER — OFFICE VISIT (OUTPATIENT)
Dept: URGENT CARE | Facility: PHYSICIAN GROUP | Age: 61
End: 2021-03-16
Payer: OTHER GOVERNMENT

## 2021-03-16 ENCOUNTER — HOSPITAL ENCOUNTER (OUTPATIENT)
Facility: MEDICAL CENTER | Age: 61
End: 2021-03-16
Attending: PHYSICIAN ASSISTANT
Payer: OTHER GOVERNMENT

## 2021-03-16 VITALS
DIASTOLIC BLOOD PRESSURE: 68 MMHG | BODY MASS INDEX: 30.72 KG/M2 | TEMPERATURE: 98.3 F | SYSTOLIC BLOOD PRESSURE: 108 MMHG | HEIGHT: 65 IN | WEIGHT: 184.4 LBS | RESPIRATION RATE: 16 BRPM | HEART RATE: 107 BPM | OXYGEN SATURATION: 93 %

## 2021-03-16 DIAGNOSIS — Z20.822 SUSPECTED COVID-19 VIRUS INFECTION: ICD-10-CM

## 2021-03-16 DIAGNOSIS — Z20.828 CONTACT WITH AND (SUSPECTED) EXPOSURE TO OTHER VIRAL COMMUNICABLE DISEASES: ICD-10-CM

## 2021-03-16 LAB — COVID ORDER STATUS COVID19: NORMAL

## 2021-03-16 PROCEDURE — U0003 INFECTIOUS AGENT DETECTION BY NUCLEIC ACID (DNA OR RNA); SEVERE ACUTE RESPIRATORY SYNDROME CORONAVIRUS 2 (SARS-COV-2) (CORONAVIRUS DISEASE [COVID-19]), AMPLIFIED PROBE TECHNIQUE, MAKING USE OF HIGH THROUGHPUT TECHNOLOGIES AS DESCRIBED BY CMS-2020-01-R: HCPCS

## 2021-03-16 PROCEDURE — U0005 INFEC AGEN DETEC AMPLI PROBE: HCPCS

## 2021-03-16 PROCEDURE — 99213 OFFICE O/P EST LOW 20 MIN: CPT | Mod: CS | Performed by: PHYSICIAN ASSISTANT

## 2021-03-16 ASSESSMENT — ENCOUNTER SYMPTOMS
CHILLS: 1
STRIDOR: 0
SPUTUM PRODUCTION: 0
VOMITING: 0
NAUSEA: 1
ABDOMINAL PAIN: 0
FEVER: 0
SHORTNESS OF BREATH: 0
DIAPHORESIS: 0
DIARRHEA: 0
SINUS PAIN: 0
WHEEZING: 0
MYALGIAS: 1
HEADACHES: 1
DIZZINESS: 0
PALPITATIONS: 0
COUGH: 1
SORE THROAT: 0

## 2021-03-16 NOTE — PROGRESS NOTES
Subjective:   Dominga Hatfield is a 60 y.o. female who presents for Headache (chills, body aches, nausea, x1 week )      HPI:  This is a very pleasant 60-year-old female presenting to the clinic with headache, body aches, chills and intermittent nausea x1 week.  Patient states her symptoms have remained fairly constant.  She informs me that 5 family members that all live in the same household are currently positive for COVID-19.  They all started experiencing symptoms last Tuesday.  Currently she has an intermittent dry cough.  She denies any shortness of breath or difficulty breathing.  She has not been running a fever.  The house has been quarantining together.  She denies any chest pain.  She has not had a fever.  She has been alternating Tylenol and ibuprofen for the body aches and chills.  Denies any history of cardiopulmonary disease.    Review of Systems   Constitutional: Positive for chills and malaise/fatigue. Negative for diaphoresis and fever.   HENT: Negative for congestion, ear pain, sinus pain and sore throat.    Respiratory: Positive for cough. Negative for sputum production, shortness of breath, wheezing and stridor.    Cardiovascular: Negative for chest pain and palpitations.   Gastrointestinal: Positive for nausea. Negative for abdominal pain, diarrhea and vomiting.   Musculoskeletal: Positive for myalgias.   Neurological: Positive for headaches. Negative for dizziness.   Endo/Heme/Allergies: Negative for environmental allergies.       Medications:    • This patient does not have an active medication from one of the medication groupers.    Allergies: Patient has no known allergies.    Problem List: Dominga Hatfield has Obesity (BMI 30-39.9); Vitamin D deficiency; and Chronic pain of both shoulders on their problem list.    Surgical History:  Past Surgical History:   Procedure Laterality Date   • ARTHROPLASTY, SHOULDER Right 2016   • CLAVICLE ORIF  2014       Past Social Hx: Dominga Hatfield   "reports that she has never smoked. She has never used smokeless tobacco. She reports that she does not drink alcohol and does not use drugs.     Past Family Hx:  Dominga Hatfield Family history is unknown by patient.     Problem list, medications, and allergies reviewed by myself today in Epic.     Objective:     /68 (BP Location: Right arm, Patient Position: Sitting, BP Cuff Size: Adult)   Pulse (!) 107   Temp 36.8 °C (98.3 °F) (Temporal)   Resp 16   Ht 1.638 m (5' 4.5\")   Wt 83.6 kg (184 lb 6.4 oz)   SpO2 93%   BMI 31.16 kg/m²     Physical Exam  Constitutional:       General: She is not in acute distress.     Appearance: Normal appearance. She is not ill-appearing, toxic-appearing or diaphoretic.   HENT:      Head: Normocephalic and atraumatic.      Right Ear: Ear canal and external ear normal.      Left Ear: Ear canal and external ear normal.      Ears:      Comments: Bilateral TMs injected     Nose: Congestion present. No rhinorrhea.      Mouth/Throat:      Mouth: Mucous membranes are moist.      Pharynx: No oropharyngeal exudate or posterior oropharyngeal erythema.   Eyes:      Conjunctiva/sclera: Conjunctivae normal.   Cardiovascular:      Rate and Rhythm: Regular rhythm. Tachycardia present.      Pulses: Normal pulses.      Heart sounds: Normal heart sounds.   Pulmonary:      Effort: Pulmonary effort is normal.      Breath sounds: Normal breath sounds. No wheezing, rhonchi or rales.   Musculoskeletal:      Cervical back: Normal range of motion. No muscular tenderness.   Lymphadenopathy:      Cervical: No cervical adenopathy.   Skin:     General: Skin is warm and dry.      Capillary Refill: Capillary refill takes less than 2 seconds.   Neurological:      General: No focal deficit present.      Mental Status: She is alert and oriented to person, place, and time. Mental status is at baseline.   Psychiatric:         Mood and Affect: Mood normal.         Thought Content: Thought content normal. "           Assessment/Plan:     Diagnosis and associated orders:     1. Suspected COVID-19 virus infection  COVID/SARS CoV-2 PCR   2. Contact with and (suspected) exposure to other viral communicable diseases  COVID/SARS CoV-2 PCR      Comments/MDM:     Patient's vital signs are reassuring and they appear hemodynamically stable and do not require higher level care at this time  I discussed self isolation and provided printed instructions. Stay isolated until results are made available. May take 2-3 days.  Recommended supportive treatment including increased fluids and rest.  Use Tylenol and ibuprofen as needed for pain and fever.   I educated the patient on possibility of a false-negative test and indications for repeat testing  I instructed the patient to try to follow up with their PCP (if applicable) for follow up care  I provided the patient the printed AVS which contains information about signing up for [employer]   I will contact the patient via [employer] with Covid results.  Red flag symptoms were discussed with the patient at length today.  If any of these symptoms present return to the clinic or nearest emergency department.    Please call with any questions or concerns.           Differential diagnosis, natural history, supportive care, and indications for immediate follow-up discussed.    Advised the patient to follow-up with the primary care physician for recheck, reevaluation, and consideration of further management.    Please note that this dictation was created using voice recognition software. I have made reasonable attempt to correct obvious errors, but I expect that there are errors of grammar and possibly content that I did not discover before finalizing the note.    This note was electronically signed by SANGITA Mtz PA-C

## 2021-03-17 LAB
SARS-COV-2 RNA RESP QL NAA+PROBE: DETECTED
SPECIMEN SOURCE: ABNORMAL

## 2021-11-17 ENCOUNTER — OFFICE VISIT (OUTPATIENT)
Dept: MEDICAL GROUP | Facility: PHYSICIAN GROUP | Age: 61
End: 2021-11-17
Payer: OTHER GOVERNMENT

## 2021-11-17 VITALS
SYSTOLIC BLOOD PRESSURE: 122 MMHG | BODY MASS INDEX: 30.16 KG/M2 | HEIGHT: 65 IN | OXYGEN SATURATION: 98 % | WEIGHT: 181 LBS | DIASTOLIC BLOOD PRESSURE: 64 MMHG | HEART RATE: 73 BPM | TEMPERATURE: 97.7 F

## 2021-11-17 DIAGNOSIS — Z23 NEED FOR VACCINATION: ICD-10-CM

## 2021-11-17 DIAGNOSIS — Z00.00 PREVENTATIVE HEALTH CARE: ICD-10-CM

## 2021-11-17 DIAGNOSIS — Z12.11 COLON CANCER SCREENING: ICD-10-CM

## 2021-11-17 DIAGNOSIS — R07.81 RIB PAIN ON LEFT SIDE: ICD-10-CM

## 2021-11-17 DIAGNOSIS — R15.2 FECAL URGENCY: ICD-10-CM

## 2021-11-17 PROCEDURE — 99214 OFFICE O/P EST MOD 30 MIN: CPT | Performed by: FAMILY MEDICINE

## 2021-11-17 ASSESSMENT — PATIENT HEALTH QUESTIONNAIRE - PHQ9: CLINICAL INTERPRETATION OF PHQ2 SCORE: 0

## 2021-11-17 NOTE — PROGRESS NOTES
"CC: Rib pain    HISTORY OF THE PRESENT ILLNESS: Patient is a 61 y.o. female.     Patient initially scheduled the appointment for left-sided rib pain.  States it has been going on for many years and is very intermittent.  Located right at the lower left rib area/upper abdominal area.  Seems to go away with stretching.    She was referred to GI for fecal urgency over a year ago.  Never ended up going.  Due for colonoscopy screening.  She would like a new referral for this.    Allergies: Patient has no known allergies.    No current [facility]-ordered outpatient medications on file.     No current [facility]-ordered facility-administered medications on file.       Past Medical History:   Diagnosis Date   • Clavicle fracture    • Rotator cuff tear arthropathy, right        Past Surgical History:   Procedure Laterality Date   • ARTHROPLASTY, SHOULDER Right 2016   • CLAVICLE ORIF  2014       Social History     Tobacco Use   • Smoking status: Never Smoker   • Smokeless tobacco: Never Used   Vaping Use   • Vaping Use: Never used   Substance Use Topics   • Alcohol use: No   • Drug use: No       Social History     Social History Narrative   • Not on file       Family History   Family history unknown: Yes       Exam: /64 (BP Location: Left arm, Patient Position: Sitting, BP Cuff Size: Adult)   Pulse 73   Temp 36.5 °C (97.7 °F) (Temporal)   Ht 1.638 m (5' 4.5\")   Wt 82.1 kg (181 lb)   SpO2 98%  Body mass index is 30.59 kg/m².    General: Well appearing, NAD  Abdomen: Soft, nontender, nondistended. Normal bowel sounds. Liver and spleen are not palpable. No rebound or guarding  Musculoskeletal: Unable to reproduce pain with rib palpation  Psych: Normal mood and affect. Alert and oriented. Judgment and insight is normal.    Please note that this dictation was created using voice recognition software. I have made every reasonable attempt to correct obvious errors, but I expect that there are errors of grammar and possibly content " that I did not discover before finalizing the note.      Assessment/Plan  Dominga was seen today for rib pain and referral needed.    Diagnoses and all orders for this visit:    Rib pain on left side  Pain not currently present and no significant exam findings.  Most likely musculoskeletal as it resolves with stretching.  The only other thing in close proximity to that area is the bowels.  We are referring to GI for colonoscopy as below.    Fecal urgency  Colon cancer screening  -     Referral to Gastroenterology    [facility] care  -     CBC WITH DIFFERENTIAL; Future  -     Comp Metabolic Panel; Future  -     Lipid Profile; Future  -     VITAMIN D,25 HYDROXY; Future  -     TSH WITH REFLEX TO FT4; Future    Need for vaccination  Reviewed vaccinations with patient.  She plans to pursue Tdap, shingles and possibly flu vaccine, though unfortunately her refrigerator went out and our vaccines are bad.  She plans to come back next week to get these.  Not interested in Covid vaccine at this time.    Follow-up yearly for routine care or sooner if needed.    Rupa Cartagena,   [facility]

## 2021-12-01 ENCOUNTER — APPOINTMENT (OUTPATIENT)
Dept: RADIOLOGY | Facility: MEDICAL CENTER | Age: 61
End: 2021-12-01
Attending: FAMILY MEDICINE
Payer: OTHER GOVERNMENT

## 2021-12-01 DIAGNOSIS — Z12.31 VISIT FOR SCREENING MAMMOGRAM: ICD-10-CM

## 2021-12-31 ENCOUNTER — OFFICE VISIT (OUTPATIENT)
Dept: URGENT CARE | Facility: PHYSICIAN GROUP | Age: 61
End: 2021-12-31
Payer: OTHER GOVERNMENT

## 2021-12-31 ENCOUNTER — HOSPITAL ENCOUNTER (OUTPATIENT)
Dept: LAB | Facility: MEDICAL CENTER | Age: 61
End: 2021-12-31
Attending: FAMILY MEDICINE
Payer: OTHER GOVERNMENT

## 2021-12-31 VITALS
OXYGEN SATURATION: 97 % | RESPIRATION RATE: 16 BRPM | HEIGHT: 65 IN | SYSTOLIC BLOOD PRESSURE: 104 MMHG | BODY MASS INDEX: 30.16 KG/M2 | WEIGHT: 181 LBS | HEART RATE: 70 BPM | TEMPERATURE: 97.6 F | DIASTOLIC BLOOD PRESSURE: 70 MMHG

## 2021-12-31 DIAGNOSIS — Z00.00 PREVENTATIVE HEALTH CARE: ICD-10-CM

## 2021-12-31 DIAGNOSIS — M62.838 NECK MUSCLE SPASM: ICD-10-CM

## 2021-12-31 DIAGNOSIS — S16.1XXA NECK MUSCLE STRAIN, INITIAL ENCOUNTER: ICD-10-CM

## 2021-12-31 LAB
25(OH)D3 SERPL-MCNC: 25 NG/ML (ref 30–100)
ALBUMIN SERPL BCP-MCNC: 4.4 G/DL (ref 3.2–4.9)
ALBUMIN/GLOB SERPL: 1.4 G/DL
ALP SERPL-CCNC: 102 U/L (ref 30–99)
ALT SERPL-CCNC: 84 U/L (ref 2–50)
ANION GAP SERPL CALC-SCNC: 9 MMOL/L (ref 7–16)
AST SERPL-CCNC: 58 U/L (ref 12–45)
BASOPHILS # BLD AUTO: 0.4 % (ref 0–1.8)
BASOPHILS # BLD: 0.02 K/UL (ref 0–0.12)
BILIRUB SERPL-MCNC: 0.3 MG/DL (ref 0.1–1.5)
BUN SERPL-MCNC: 18 MG/DL (ref 8–22)
CALCIUM SERPL-MCNC: 9.5 MG/DL (ref 8.5–10.5)
CHLORIDE SERPL-SCNC: 107 MMOL/L (ref 96–112)
CHOLEST SERPL-MCNC: 185 MG/DL (ref 100–199)
CO2 SERPL-SCNC: 23 MMOL/L (ref 20–33)
CREAT SERPL-MCNC: 0.92 MG/DL (ref 0.5–1.4)
EOSINOPHIL # BLD AUTO: 0.06 K/UL (ref 0–0.51)
EOSINOPHIL NFR BLD: 1.3 % (ref 0–6.9)
ERYTHROCYTE [DISTWIDTH] IN BLOOD BY AUTOMATED COUNT: 42.7 FL (ref 35.9–50)
FASTING STATUS PATIENT QL REPORTED: NORMAL
GLOBULIN SER CALC-MCNC: 3.1 G/DL (ref 1.9–3.5)
GLUCOSE SERPL-MCNC: 111 MG/DL (ref 65–99)
HCT VFR BLD AUTO: 44.7 % (ref 37–47)
HDLC SERPL-MCNC: 36 MG/DL
HGB BLD-MCNC: 15.1 G/DL (ref 12–16)
IMM GRANULOCYTES # BLD AUTO: 0.01 K/UL (ref 0–0.11)
IMM GRANULOCYTES NFR BLD AUTO: 0.2 % (ref 0–0.9)
LDLC SERPL CALC-MCNC: 133 MG/DL
LYMPHOCYTES # BLD AUTO: 1.78 K/UL (ref 1–4.8)
LYMPHOCYTES NFR BLD: 39.3 % (ref 22–41)
MCH RBC QN AUTO: 30.6 PG (ref 27–33)
MCHC RBC AUTO-ENTMCNC: 33.8 G/DL (ref 33.6–35)
MCV RBC AUTO: 90.7 FL (ref 81.4–97.8)
MONOCYTES # BLD AUTO: 0.4 K/UL (ref 0–0.85)
MONOCYTES NFR BLD AUTO: 8.8 % (ref 0–13.4)
NEUTROPHILS # BLD AUTO: 2.26 K/UL (ref 2–7.15)
NEUTROPHILS NFR BLD: 50 % (ref 44–72)
NRBC # BLD AUTO: 0 K/UL
NRBC BLD-RTO: 0 /100 WBC
PLATELET # BLD AUTO: 272 K/UL (ref 164–446)
PMV BLD AUTO: 10.8 FL (ref 9–12.9)
POTASSIUM SERPL-SCNC: 4.4 MMOL/L (ref 3.6–5.5)
PROT SERPL-MCNC: 7.5 G/DL (ref 6–8.2)
RBC # BLD AUTO: 4.93 M/UL (ref 4.2–5.4)
SODIUM SERPL-SCNC: 139 MMOL/L (ref 135–145)
TRIGL SERPL-MCNC: 82 MG/DL (ref 0–149)
TSH SERPL DL<=0.005 MIU/L-ACNC: 4.13 UIU/ML (ref 0.38–5.33)
WBC # BLD AUTO: 4.5 K/UL (ref 4.8–10.8)

## 2021-12-31 PROCEDURE — 82306 VITAMIN D 25 HYDROXY: CPT

## 2021-12-31 PROCEDURE — 80061 LIPID PANEL: CPT

## 2021-12-31 PROCEDURE — 36415 COLL VENOUS BLD VENIPUNCTURE: CPT

## 2021-12-31 PROCEDURE — 99213 OFFICE O/P EST LOW 20 MIN: CPT | Performed by: NURSE PRACTITIONER

## 2021-12-31 PROCEDURE — 80053 COMPREHEN METABOLIC PANEL: CPT

## 2021-12-31 PROCEDURE — 84443 ASSAY THYROID STIM HORMONE: CPT

## 2021-12-31 PROCEDURE — 85025 COMPLETE CBC W/AUTO DIFF WBC: CPT

## 2021-12-31 RX ORDER — METHYLPREDNISOLONE 4 MG/1
TABLET ORAL
Qty: 21 TABLET | Refills: 0 | Status: SHIPPED | OUTPATIENT
Start: 2021-12-31 | End: 2022-01-05

## 2021-12-31 RX ORDER — CYCLOBENZAPRINE HCL 5 MG
5 TABLET ORAL 3 TIMES DAILY PRN
Qty: 30 TABLET | Refills: 0 | Status: SHIPPED | OUTPATIENT
Start: 2021-12-31 | End: 2022-01-05

## 2021-12-31 ASSESSMENT — ENCOUNTER SYMPTOMS
CHILLS: 0
DIZZINESS: 0
BRUISES/BLEEDS EASILY: 0
HEADACHES: 0
WEAKNESS: 0
FALLS: 0
TINGLING: 0
FEVER: 0
CARDIOVASCULAR NEGATIVE: 1
SENSORY CHANGE: 0
RESPIRATORY NEGATIVE: 1
MYALGIAS: 1
NECK PAIN: 1

## 2021-12-31 NOTE — PROGRESS NOTES
"Subjective     Camila Hatfield is a 61 y.o. female who presents with Neck Pain (Neck and left shoulder pain x 4 days.  Having trouble moving head side to side.)            HPI  States has been experiencing cervical region neck stiffness x 4 days. Tylenol use twice daily and heat application not helping. Unable to sleep at night due to discomfort. Denies trauma, injury or fall.     PMH:  has a past medical history of Clavicle fracture and Rotator cuff tear arthropathy, right.  MEDS:   Current Outpatient Medications:   •  methylPREDNISolone (MEDROL DOSEPAK) 4 MG Tablet Therapy Pack, Follow schedule on package instructions., Disp: 21 Tablet, Rfl: 0  •  cyclobenzaprine (FLEXERIL) 5 mg tablet, Take 1 Tablet by mouth 3 times a day as needed for Muscle Spasms. Causes drowsiness, do not drive or work while using. Caution with use with other sedating medications., Disp: 30 Tablet, Rfl: 0  ALLERGIES: No Known Allergies  SURGHX:   Past Surgical History:   Procedure Laterality Date   • ARTHROPLASTY, SHOULDER Right 2016   • ORIF, FRACTURE, CLAVICLE  2014     SOCHX:  reports that she has never smoked. She has never used smokeless tobacco. She reports that she does not drink alcohol and does not use drugs.  FH: Family history was reviewed, no pertinent findings to report    Review of Systems   Constitutional: Negative for chills, fever and malaise/fatigue.   Respiratory: Negative.    Cardiovascular: Negative.    Musculoskeletal: Positive for myalgias and neck pain. Negative for falls and joint pain.   Skin: Negative for itching and rash.   Neurological: Negative for dizziness, tingling, sensory change, weakness and headaches.   Endo/Heme/Allergies: Does not bruise/bleed easily.   All other systems reviewed and are negative.             Objective     /70   Pulse 70   Temp 36.4 °C (97.6 °F) (Temporal)   Resp 16   Ht 1.638 m (5' 4.5\")   Wt 82.1 kg (181 lb)   SpO2 97%   BMI 30.59 kg/m²      Physical Exam  Vitals reviewed. "   Constitutional:       General: She is awake. She is not in acute distress.     Appearance: Normal appearance. She is not ill-appearing, toxic-appearing or diaphoretic.   HENT:      Head: Normocephalic.   Eyes:      Pupils: Pupils are equal, round, and reactive to light.   Cardiovascular:      Rate and Rhythm: Normal rate.   Pulmonary:      Effort: Pulmonary effort is normal.   Musculoskeletal:      Cervical back: Spasms present. No swelling, edema, deformity, erythema, signs of trauma, lacerations, rigidity, torticollis, tenderness, bony tenderness or crepitus. Muscular tenderness present. No pain with movement or spinous process tenderness. Decreased range of motion.        Back:    Skin:     General: Skin is warm and dry.      Findings: No erythema or rash.   Neurological:      Mental Status: She is alert and oriented to person, place, and time.   Psychiatric:         Attention and Perception: Attention normal.         Mood and Affect: Mood normal.         Speech: Speech normal.         Behavior: Behavior normal. Behavior is cooperative.                             Assessment & Plan        1. Neck muscle strain, initial encounter    - methylPREDNISolone (MEDROL DOSEPAK) 4 MG Tablet Therapy Pack; Follow schedule on package instructions.  Dispense: 21 Tablet; Refill: 0    2. Neck muscle spasm    - cyclobenzaprine (FLEXERIL) 5 mg tablet; Take 1 Tablet by mouth 3 times a day as needed for Muscle Spasms. Causes drowsiness, do not drive or work while using. Caution with use with other sedating medications.  Dispense: 30 Tablet; Refill: 0     -Take over the counter NSAID as needed for back discomfort, avoid any Ibuprofen products with oral steroid use, may use Tylenol as needed for additional pain relief  -May use cool compresses for any swelling /throbbing pain and warm compresses for muscle stiffness   -May perform gentle back muscle stretches as tolerated after warm compresses to maintain mobility, avoid abdominal  crunches, heavy lifting or repetitive motions  -May use Flexeril as directed when at home only due to drowsiness  -May apply topical analgesics as needed (preferred lidocaine based topical like salon Pas)  -Perform proper body mechanics with lifting, twisting, bending and walking. Ask for assistance with heavy objects as needed   -Monitor for bowel/urination problems, numbness/tingling in extremities, decreased range of lizbeth with ambulation difficulty- need re-evaluation

## 2022-01-03 DIAGNOSIS — R10.10 UPPER ABDOMINAL PAIN: ICD-10-CM

## 2022-01-03 DIAGNOSIS — R74.01 TRANSAMINITIS: ICD-10-CM

## 2022-01-05 ENCOUNTER — OFFICE VISIT (OUTPATIENT)
Dept: MEDICAL GROUP | Facility: PHYSICIAN GROUP | Age: 62
End: 2022-01-05
Payer: OTHER GOVERNMENT

## 2022-01-05 ENCOUNTER — TELEPHONE (OUTPATIENT)
Dept: MEDICAL GROUP | Facility: PHYSICIAN GROUP | Age: 62
End: 2022-01-05

## 2022-01-05 VITALS
HEIGHT: 65 IN | SYSTOLIC BLOOD PRESSURE: 130 MMHG | OXYGEN SATURATION: 95 % | TEMPERATURE: 98.4 F | WEIGHT: 186.6 LBS | DIASTOLIC BLOOD PRESSURE: 86 MMHG | HEART RATE: 74 BPM | RESPIRATION RATE: 12 BRPM | BODY MASS INDEX: 31.09 KG/M2

## 2022-01-05 DIAGNOSIS — M62.838 NECK MUSCLE SPASM: ICD-10-CM

## 2022-01-05 DIAGNOSIS — S16.1XXD NECK STRAIN, SUBSEQUENT ENCOUNTER: ICD-10-CM

## 2022-01-05 PROCEDURE — 99214 OFFICE O/P EST MOD 30 MIN: CPT | Performed by: NURSE PRACTITIONER

## 2022-01-05 RX ORDER — LIDOCAINE 4 G/G
1 PATCH TOPICAL DAILY
Qty: 10 PATCH | Refills: 0 | Status: SHIPPED | OUTPATIENT
Start: 2022-01-05 | End: 2022-01-06 | Stop reason: DRUGHIGH

## 2022-01-05 RX ORDER — TIZANIDINE 4 MG/1
4-8 TABLET ORAL EVERY 6 HOURS PRN
Qty: 30 TABLET | Refills: 0 | Status: SHIPPED | OUTPATIENT
Start: 2022-01-05

## 2022-01-05 ASSESSMENT — PATIENT HEALTH QUESTIONNAIRE - PHQ9: CLINICAL INTERPRETATION OF PHQ2 SCORE: 0

## 2022-01-05 ASSESSMENT — FIBROSIS 4 INDEX: FIB4 SCORE: 1.37

## 2022-01-05 NOTE — PROGRESS NOTES
"Subjective  Chief Complaint  Chief Complaint   Patient presents with   • Follow-Up     neck pain, unable to sleep      History of Present Illness  Dominga presents today with the following:    Neck pain:  Symptoms started about 2 weeks ago.  She reports neck pain at its worse feels as if its pressure; otherwise, sharp, stabbing pain.  All pain to left side.  She denies trauma to the area, injury or fall.  She is unable to sleep due to the pain.  She is unable to turn neck all the way.  She has completed Medrol prescription; flexeril as needed - uses once a day at night.  She has been using heating pad, gentle stretches/exercises, TENs unit to the area - reports does help alleviate some of the pain.  She does also take Tylenol, icy hot application with minimal relief.      Past Medical History    Allergies: Patient has no known allergies.  Past Medical History:   Diagnosis Date   • Clavicle fracture    • Rotator cuff tear arthropathy, right      Current Outpatient Medications Ordered in Epic   Medication Sig Dispense Refill   • tizanidine (ZANAFLEX) 4 MG Tab Take 1-2 Tablets by mouth every 6 hours as needed (moderate pain/muscle spasm). 30 Tablet 0   • Lidocaine 4 % Patch Apply 1 Patch topically every day for 10 days. 10 Patch 0     No current Epic-ordered facility-administered medications on file.     Review of Systems  See below.     Objective  Physical Exam  /86 (BP Location: Left arm, Patient Position: Sitting, BP Cuff Size: Adult)   Pulse 74   Temp 36.9 °C (98.4 °F) (Temporal)   Resp 12   Ht 1.638 m (5' 4.5\")   Wt 84.6 kg (186 lb 9.6 oz)   SpO2 95%  Body mass index is 31.54 kg/m².  General:  Alert and oriented.  Well appearing.  NAD  Musculoskeletal:  No extremity cyanosis, clubbing, or edema.  Neck: No visible deformity. Left lateral spinal along trapezius muscle tenderness to palpation. Limited ROM. Spurling's test negative.     Assessment/Plan  1. Neck strain, subsequent encounter  2. Neck " muscle spasm  - tizanidine (ZANAFLEX) 4 MG Tab; Take 1-2 Tablets by mouth every 6 hours as needed (moderate pain/muscle spasm).  Dispense: 30 Tablet; Refill: 0  - Referral to Physical Therapy  - Lidocaine 4 % Patch; Apply 1 Patch topically every day for 10 days.  Dispense: 10 Patch; Refill: 0    Acute condition, not controlled.  Discussed with patient likely muscle strain; discussed etiology and likely timeline of improvement.  Advised supportive treatment at home, including rest, ice/heat, OTC NSAIDs, gentle stretches and range of motion exercise.  Will provide patient with alternative muscle spasm as flexeril not effective; advised to stop flexeril.  Icy/hot application with minimal relief; will trial lidocaine patches for topical relief.  Return precautions discussed.  Advised should pain not improve, physical therapy is an additional option; referral placed today so that she may attend should she chose to go.      I have placed the below orders and discussed them with an approved delegating provider.  The MA is performing the below orders under the direction of Dr. Cartagena.    Please note that this dictation was created using voice recognition software. I have worked with consultants from the vendor as well as technical experts from [facility] to optimize the interface. I have made every reasonable attempt to correct obvious errors, but I expect that there are errors of grammar and possibly content that I did not discover before finalizing the note.    TRAVIS Echols  [facility]

## 2022-01-06 RX ORDER — LIDOCAINE 50 MG/G
1 PATCH TOPICAL EVERY 24 HOURS
Qty: 10 PATCH | Refills: 0 | Status: SHIPPED | OUTPATIENT
Start: 2022-01-06 | End: 2022-01-16

## 2022-01-06 NOTE — TELEPHONE ENCOUNTER
1. Neck strain, subsequent encounter  - lidocaine (LIDODERM) 5 % Patch; Place 1 Patch on the skin every 24 hours for 10 days.  Dispense: 10 Patch; Refill: 0    2. Neck muscle spasm  - lidocaine (LIDODERM) 5 % Patch; Place 1 Patch on the skin every 24 hours for 10 days.  Dispense: 10 Patch; Refill: 0    Changed from 4% patches to 5% due to availability at [facility].

## 2022-01-06 NOTE — TELEPHONE ENCOUNTER
Pt Called stating that [facility] only has a 5% and was wondering if you can change. The Lidocaine patch to 5% instead of 4%

## 2022-01-07 ENCOUNTER — HOSPITAL ENCOUNTER (OUTPATIENT)
Dept: RADIOLOGY | Facility: MEDICAL CENTER | Age: 62
End: 2022-01-07
Attending: FAMILY MEDICINE
Payer: OTHER GOVERNMENT

## 2022-01-07 DIAGNOSIS — R10.10 UPPER ABDOMINAL PAIN: ICD-10-CM

## 2022-01-07 DIAGNOSIS — K76.0 FATTY LIVER DISEASE, NONALCOHOLIC: ICD-10-CM

## 2022-01-07 DIAGNOSIS — R74.01 TRANSAMINITIS: ICD-10-CM

## 2022-01-07 PROCEDURE — 76705 ECHO EXAM OF ABDOMEN: CPT
